# Patient Record
Sex: FEMALE | Race: BLACK OR AFRICAN AMERICAN | NOT HISPANIC OR LATINO | Employment: FULL TIME | URBAN - METROPOLITAN AREA
[De-identification: names, ages, dates, MRNs, and addresses within clinical notes are randomized per-mention and may not be internally consistent; named-entity substitution may affect disease eponyms.]

---

## 2017-10-02 ENCOUNTER — HOSPITAL ENCOUNTER (EMERGENCY)
Facility: HOSPITAL | Age: 27
Discharge: HOME | End: 2017-10-02
Attending: EMERGENCY MEDICINE
Payer: MEDICAID

## 2017-10-02 VITALS
BODY MASS INDEX: 19.7 KG/M2 | TEMPERATURE: 98 F | WEIGHT: 130 LBS | HEIGHT: 68 IN | DIASTOLIC BLOOD PRESSURE: 97 MMHG | OXYGEN SATURATION: 100 % | SYSTOLIC BLOOD PRESSURE: 135 MMHG | RESPIRATION RATE: 20 BRPM | HEART RATE: 75 BPM

## 2017-10-02 DIAGNOSIS — L03.213 PERIORBITAL CELLULITIS OF LEFT EYE: Primary | ICD-10-CM

## 2017-10-02 PROCEDURE — 99283 EMERGENCY DEPT VISIT LOW MDM: CPT

## 2017-10-02 RX ORDER — CLINDAMYCIN HYDROCHLORIDE 150 MG/1
450 CAPSULE ORAL EVERY 8 HOURS
Qty: 90 CAPSULE | Refills: 0 | Status: SHIPPED | OUTPATIENT
Start: 2017-10-02 | End: 2017-10-12

## 2017-10-02 RX ORDER — TRAMADOL HYDROCHLORIDE 50 MG/1
50 TABLET ORAL EVERY 6 HOURS PRN
Qty: 20 TABLET | Refills: 0 | Status: SHIPPED | OUTPATIENT
Start: 2017-10-02 | End: 2017-10-12

## 2017-10-02 NOTE — DISCHARGE INSTRUCTIONS
Call to schedule a follow up appointment for re-evaluation    Return to ED if symptoms worsen    Take anti-biotic as directed

## 2017-10-02 NOTE — ED TRIAGE NOTES
Patient complaints of left eye pain and swelling since Saturday.  Patient states it started looking like a stye and she put warm compresses to area, but now swelling and pain has increased.  She is unable to open her eye.  No shortness of breath, no other swelling or complaint.

## 2017-10-02 NOTE — ED PROVIDER NOTES
Encounter Date: 10/2/2017       History     Chief Complaint   Patient presents with    Facial Swelling     26 year old female presents to ed cc of left eye swelling that began on saturday. patient reports discharge from eye. patient reports pain when trying to open eye. patient reports using warm compressess with no relief.      27 y/o F presents with swelling and pain to left eyelid of acute onset 2 days ago. Pain is exacerbated by palpation, no relieving measures attempted.  Pt reports clear drainage from left eye.  No matting of lashes.  No change in vision.  No pain with EOM.  No fever/chills/swollen lymph nodes.  No eye trauma or recent infection.  Pt does not use contact lenses.  No meds pta for pain.           Review of patient's allergies indicates:  No Known Allergies  History reviewed. No pertinent past medical history.  Past Surgical History:   Procedure Laterality Date     SECTION, LOW TRANSVERSE      failed induction for oligo    EYE SURGERY      on right eye stye     History reviewed. No pertinent family history.  Social History   Substance Use Topics    Smoking status: Never Smoker    Smokeless tobacco: Never Used    Alcohol use No     Review of Systems   Constitutional: Negative for chills and fever.   HENT: Negative for congestion.    Eyes: Positive for pain and discharge. Negative for photophobia, redness and visual disturbance.        Left periorbital swelling   Gastrointestinal: Negative for nausea and vomiting.   Skin: Negative for pallor and rash.   Allergic/Immunologic: Negative for immunocompromised state.   Neurological: Negative for dizziness and headaches.   Hematological: Negative for adenopathy.   All other systems reviewed and are negative.      Physical Exam     Initial Vitals [10/02/17 1057]   BP Pulse Resp Temp SpO2   (!) 135/97 75 20 98.4 °F (36.9 °C) 100 %      MAP       109.67         Physical Exam    Nursing note and vitals reviewed.  Constitutional: She appears  well-developed and well-nourished. She is not diaphoretic. No distress.   HENT:   Head: Normocephalic and atraumatic.   Mouth/Throat: Oropharynx is clear and moist.   Eyes: Conjunctivae and EOM are normal. Pupils are equal, round, and reactive to light. Right eye exhibits no discharge. Left eye exhibits discharge. No scleral icterus.   Left upper eyelid swelling and TTP, no abscess, + cellulitis.      Pt has no pain with EOM   Neck: Normal range of motion. Neck supple.   Cardiovascular: Normal rate, regular rhythm and normal heart sounds. Exam reveals no gallop and no friction rub.    No murmur heard.  Pulmonary/Chest: Breath sounds normal. No respiratory distress.   Musculoskeletal: Normal range of motion.   Lymphadenopathy:     She has no cervical adenopathy.   Neurological: She is alert and oriented to person, place, and time.   Skin: Skin is warm and dry.   Psychiatric: She has a normal mood and affect. Her behavior is normal. Judgment and thought content normal.         ED Course   Procedures  Labs Reviewed   POCT URINE PREGNANCY             Medical Decision Making:   Initial Assessment:   27 y/o F presents with swelling and pain to left eyelid of acute onset 2 days ago. Pain is exacerbated by palpation, no relieving measures attempted.  Pt reports clear drainage from left eye.  No matting of lashes.  No change in vision.  No pain with EOM.  No fever/chills/swollen lymph nodes.  No eye trauma or recent infection.  Pt does not use contact lenses.  No meds pta for pain.           Differential Diagnosis:   DDX: conjunctivitis, periorbital cellulitis, orbital cellulitis  ED Management:   Pt exam is consistent with fern-orbital cellulitis.  She has no change in vision or pain with EOM.  She is non-toxic and well appearing.  I will tx with clindamycin and have her f/u with pcp and eye doctor.     Pt counseled on their diagnosis and the importance of following up with PCP.  Pt also cautioned on when to return to ED.   Pt verbalizes understanding of discharge plan and will return to ED immediately if symptoms worsen                     ED Course      Clinical Impression:   The encounter diagnosis was Periorbital cellulitis of left eye.    Disposition:   Disposition: Discharged  Condition: Stable                        Mariela Omer MD  10/02/17 1137

## 2017-10-05 ENCOUNTER — TELEPHONE (OUTPATIENT)
Dept: OPHTHALMOLOGY | Facility: CLINIC | Age: 27
End: 2017-10-05

## 2017-10-05 NOTE — TELEPHONE ENCOUNTER
Returned pt call regarding scheduling an ER f/u appointment for periorbital cellulitis.  Advised pt, per Terri, that we do not accept [facility] and pt could try following up with [facility].  Pt understood.

## 2017-11-12 PROBLEM — R19.7 VOMITING AND DIARRHEA: Status: ACTIVE | Noted: 2017-11-12

## 2017-11-12 PROBLEM — R11.10 VOMITING AND DIARRHEA: Status: ACTIVE | Noted: 2017-11-12

## 2019-08-27 ENCOUNTER — OFFICE VISIT (OUTPATIENT)
Dept: UROLOGY | Facility: CLINIC | Age: 29
End: 2019-08-27
Payer: MEDICAID

## 2019-08-27 VITALS
WEIGHT: 133 LBS | HEIGHT: 67 IN | RESPIRATION RATE: 19 BRPM | DIASTOLIC BLOOD PRESSURE: 64 MMHG | SYSTOLIC BLOOD PRESSURE: 111 MMHG | OXYGEN SATURATION: 99 % | HEART RATE: 72 BPM | BODY MASS INDEX: 20.88 KG/M2

## 2019-08-27 DIAGNOSIS — R35.0 URINARY FREQUENCY: ICD-10-CM

## 2019-08-27 DIAGNOSIS — R39.14 FEELING OF INCOMPLETE BLADDER EMPTYING: Primary | ICD-10-CM

## 2019-08-27 LAB
BILIRUB SERPL-MCNC: NORMAL MG/DL
BLOOD URINE, POC: NORMAL
COLOR, POC UA: YELLOW
GLUCOSE UR QL STRIP: NORMAL
KETONES UR QL STRIP: NORMAL
LEUKOCYTE ESTERASE URINE, POC: NORMAL
NITRITE, POC UA: NORMAL
PH, POC UA: 5
PROTEIN, POC: 30
SPECIFIC GRAVITY, POC UA: 1.03
UROBILINOGEN, POC UA: 0.2

## 2019-08-27 PROCEDURE — 99214 OFFICE O/P EST MOD 30 MIN: CPT | Mod: PBBFAC,PO | Performed by: NURSE PRACTITIONER

## 2019-08-27 PROCEDURE — 99203 OFFICE O/P NEW LOW 30 MIN: CPT | Mod: S$PBB,,, | Performed by: NURSE PRACTITIONER

## 2019-08-27 PROCEDURE — 81002 URINALYSIS NONAUTO W/O SCOPE: CPT | Mod: PBBFAC,PO | Performed by: NURSE PRACTITIONER

## 2019-08-27 PROCEDURE — 99999 PR PBB SHADOW E&M-EST. PATIENT-LVL IV: CPT | Mod: PBBFAC,,, | Performed by: NURSE PRACTITIONER

## 2019-08-27 PROCEDURE — 87086 URINE CULTURE/COLONY COUNT: CPT

## 2019-08-27 PROCEDURE — 99999 PR PBB SHADOW E&M-EST. PATIENT-LVL IV: ICD-10-PCS | Mod: PBBFAC,,, | Performed by: NURSE PRACTITIONER

## 2019-08-27 PROCEDURE — 99203 PR OFFICE/OUTPT VISIT, NEW, LEVL III, 30-44 MIN: ICD-10-PCS | Mod: S$PBB,,, | Performed by: NURSE PRACTITIONER

## 2019-08-27 RX ORDER — NITROFURANTOIN 25; 75 MG/1; MG/1
CAPSULE ORAL
COMMUNITY

## 2019-08-27 RX ORDER — METRONIDAZOLE 500 MG/1
TABLET ORAL
COMMUNITY

## 2019-08-27 RX ORDER — TAMSULOSIN HYDROCHLORIDE 0.4 MG/1
CAPSULE ORAL
COMMUNITY
Start: 2019-08-16 | End: 2019-08-27 | Stop reason: SDUPTHER

## 2019-08-27 RX ORDER — TAMSULOSIN HYDROCHLORIDE 0.4 MG/1
0.4 CAPSULE ORAL DAILY
Qty: 14 CAPSULE | Refills: 0 | Status: SHIPPED | OUTPATIENT
Start: 2019-08-27 | End: 2019-09-10

## 2019-08-27 RX ORDER — FLUCONAZOLE 150 MG/1
TABLET ORAL
COMMUNITY
End: 2021-05-20

## 2019-08-27 NOTE — PATIENT INSTRUCTIONS
1. Urine dipstick  2. Urine cx  3. Continue tamsulosin 0.4 mg daily for 2 weeks.  4. Follow-up in 3 weeks.

## 2019-08-27 NOTE — PROGRESS NOTES
Subjective:       Patient ID: Estelle Garcia is a 28 y.o. female.    Chief Complaint: Urinary Retention (health unit follow up for incomplete emptying)    Patient is new to me. She is a 27 yo AAF who is here today for evaluation of incomplete bladder emptying. Patient referred by PCP (Ana Luisa Morton). Patient reports she has not had any suprapubic pain since starting on tamsulosin 0.4 mg daily approximately 1 week ago by her PCP. She is currently being treated for BV and yeast infection.    Abdominal Pain   This is a new problem. Episode onset: 4 weeks ago. The onset quality is sudden. The problem occurs intermittently. The problem has been waxing and waning. The pain is located in the suprapubic region. The pain is at a severity of 0/10 (last experienced pain 1 week ago before starting on Flomax.). The patient is experiencing no pain. The quality of the pain is aching. The abdominal pain does not radiate. Associated symptoms include diarrhea (resolved) and frequency. Pertinent negatives include no anorexia, arthralgias, belching, constipation, dysuria, fever, flatus, headaches, hematochezia, hematuria, melena, myalgias, nausea, vomiting or weight loss. Nothing aggravates the pain. The pain is relieved by nothing. She has tried nothing for the symptoms. There is no history of abdominal surgery, colon cancer, Crohn's disease, gallstones, GERD, irritable bowel syndrome, pancreatitis, PUD or ulcerative colitis. Patient's medical history does not include kidney stones and UTI.     Review of Systems   Constitutional: Positive for fatigue. Negative for appetite change, chills, fever and weight loss.   Gastrointestinal: Positive for abdominal pain (suprapubic pain ) and diarrhea (resolved). Negative for anorexia, constipation, flatus, hematochezia, melena, nausea and vomiting.   Genitourinary: Positive for frequency. Negative for decreased urine volume, difficulty urinating, dysuria, flank pain, hematuria, pelvic pain,  urgency, vaginal bleeding, vaginal discharge and vaginal pain.        Feeling of incomplete bladder emptying.   Musculoskeletal: Positive for back pain (mid spine). Negative for arthralgias and myalgias.   Neurological: Negative for dizziness and headaches.       Objective:      Physical Exam   Constitutional: She is oriented to person, place, and time. She appears well-developed and well-nourished. No distress.   HENT:   Head: Normocephalic and atraumatic.   Eyes: Pupils are equal, round, and reactive to light. EOM are normal.   Neck: Normal range of motion.   Cardiovascular: Normal rate.   Pulmonary/Chest: Effort normal. No respiratory distress.   Abdominal: Soft. There is no tenderness.   Musculoskeletal: Normal range of motion. She exhibits no edema.   Neurological: She is alert and oriented to person, place, and time. Coordination normal.   Skin: Skin is warm and dry.   Psychiatric: She has a normal mood and affect. Her behavior is normal. Judgment and thought content normal.   Nursing note and vitals reviewed.      Assessment:       1. Feeling of incomplete bladder emptying    2. Urinary frequency        Plan:       Estelle was seen today for urinary retention.    Diagnoses and all orders for this visit:    Feeling of incomplete bladder emptying  -     tamsulosin (FLOMAX) 0.4 mg Cap; Take 1 capsule (0.4 mg total) by mouth once daily. for 14 days. REFILLED  -     POCT URINE DIPSTICK WITHOUT MICROSCOPE  -     Urine culture    Urinary frequency  -     POCT URINE DIPSTICK WITHOUT MICROSCOPE  -     Urine culture    Other order  1. Continue tamsulosin 0.4 mg daily for 2 weeks.    Follow-up in 3 weeks.     Jasmin Zaldivar NP

## 2019-08-27 NOTE — LETTER
August 27, 2019      Nohelia Morton NP  [address] [facility] [place] [address]           [place] [facility]  [address] [place] [address], [address]  [place] [address]  Phone: [phone]  Fax: [phone]          Patient: Estelle Garcia   MR Number: [id number]   YOB: 1990   Date of Visit: 8/27/2019       Dear Nohelia Morton:    Thank you for referring Estelle Garcia to me for evaluation. Attached you will find relevant portions of my assessment and plan of care.    If you have questions, please do not hesitate to call me. I look forward to following Estelle Garcia along with you.    Sincerely,    Jasmin Zaldivar NP    Enclosure  CC:  No Recipients    If you would like to receive this communication electronically, please contact [email or web address] or [phone] to request more information on [facility] Link access.    For providers and/or their staff who would like to refer a patient to [facility], please contact us through our one-stop-shop provider referral line, [facility], at [phone].    If you feel you have received this communication in error or would no longer like to receive these types of communications, please e-mail [email or web address]

## 2019-08-28 LAB
BACTERIA UR CULT: NORMAL
BACTERIA UR CULT: NORMAL

## 2019-08-29 ENCOUNTER — TELEPHONE (OUTPATIENT)
Dept: UROLOGY | Facility: CLINIC | Age: 29
End: 2019-08-29

## 2019-08-29 NOTE — TELEPHONE ENCOUNTER
----- Message from Jasmin Zaldivar NP sent at 8/29/2019  1:50 PM CDT -----  Please inform patient her urine cx showed some bacteria, but none in predominance. Keep F/U appt in 3 weeks.

## 2019-09-17 ENCOUNTER — OFFICE VISIT (OUTPATIENT)
Dept: UROLOGY | Facility: CLINIC | Age: 29
End: 2019-09-17
Payer: MEDICAID

## 2019-09-17 VITALS
HEIGHT: 67 IN | TEMPERATURE: 98 F | BODY MASS INDEX: 21.79 KG/M2 | DIASTOLIC BLOOD PRESSURE: 82 MMHG | OXYGEN SATURATION: 99 % | WEIGHT: 138.81 LBS | SYSTOLIC BLOOD PRESSURE: 110 MMHG | HEART RATE: 74 BPM

## 2019-09-17 DIAGNOSIS — Z09 FOLLOW-UP EXAMINATION: ICD-10-CM

## 2019-09-17 DIAGNOSIS — R39.14 FEELING OF INCOMPLETE BLADDER EMPTYING: Primary | ICD-10-CM

## 2019-09-17 LAB
BILIRUB SERPL-MCNC: NORMAL MG/DL
BLOOD URINE, POC: NORMAL
COLOR, POC UA: YELLOW
GLUCOSE UR QL STRIP: NORMAL
KETONES UR QL STRIP: NORMAL
LEUKOCYTE ESTERASE URINE, POC: NORMAL
NITRITE, POC UA: NORMAL
PH, POC UA: 5.5
POC RESIDUAL URINE VOLUME: 40 ML (ref 0–100)
PROTEIN, POC: 30
SPECIFIC GRAVITY, POC UA: 1.03
UROBILINOGEN, POC UA: 0.2

## 2019-09-17 PROCEDURE — 51798 US URINE CAPACITY MEASURE: CPT | Mod: PBBFAC,PO | Performed by: NURSE PRACTITIONER

## 2019-09-17 PROCEDURE — 99999 PR PBB SHADOW E&M-EST. PATIENT-LVL IV: CPT | Mod: PBBFAC,,, | Performed by: NURSE PRACTITIONER

## 2019-09-17 PROCEDURE — 99212 OFFICE O/P EST SF 10 MIN: CPT | Mod: S$PBB,,, | Performed by: NURSE PRACTITIONER

## 2019-09-17 PROCEDURE — 99999 PR PBB SHADOW E&M-EST. PATIENT-LVL IV: ICD-10-PCS | Mod: PBBFAC,,, | Performed by: NURSE PRACTITIONER

## 2019-09-17 PROCEDURE — 99212 PR OFFICE/OUTPT VISIT, EST, LEVL II, 10-19 MIN: ICD-10-PCS | Mod: S$PBB,,, | Performed by: NURSE PRACTITIONER

## 2019-09-17 PROCEDURE — 81002 URINALYSIS NONAUTO W/O SCOPE: CPT | Mod: PBBFAC,PO | Performed by: NURSE PRACTITIONER

## 2019-09-17 PROCEDURE — 99214 OFFICE O/P EST MOD 30 MIN: CPT | Mod: PBBFAC,PO,25 | Performed by: NURSE PRACTITIONER

## 2019-09-17 NOTE — PROGRESS NOTES
Subjective:       Patient ID: Estelle Garcia is a 28 y.o. female.    Chief Complaint: Urinary Retention (follow-up)    Patient is here today for a F/U for feeling of incomplete bladder emptying. She has been taking tamsulosin 0.4 mg daily for 4 weeks. Patient denies any suprapubic pain or difficulty voiding at this time. She has completed all her medications and reports she feels back to her normal self.     Other   Chronicity: incomplete bladder emptying. The current episode started more than 1 month ago. The problem occurs daily. The problem has been resolved. Pertinent negatives include no abdominal pain, anorexia, arthralgias, change in bowel habit, chills, diaphoresis, fatigue, fever, headaches, nausea, swollen glands, urinary symptoms, vomiting or weakness. Nothing aggravates the symptoms. Treatments tried: tamsulosin. The treatment provided significant relief.     Review of Systems   Constitutional: Negative for appetite change, chills, diaphoresis, fatigue and fever.   Gastrointestinal: Negative for abdominal pain, anorexia, change in bowel habit, constipation, diarrhea, nausea and vomiting.   Genitourinary: Negative for decreased urine volume, difficulty urinating, dysuria, flank pain, frequency, hematuria, urgency, vaginal bleeding, vaginal discharge and vaginal pain.   Musculoskeletal: Negative for arthralgias.   Neurological: Negative for dizziness, weakness and headaches.   Psychiatric/Behavioral: Negative.        Objective:      Physical Exam   Constitutional: She is oriented to person, place, and time. She appears well-developed and well-nourished. No distress.   HENT:   Head: Normocephalic and atraumatic.   Eyes: Pupils are equal, round, and reactive to light. EOM are normal.   Neck: Normal range of motion.   Cardiovascular: Normal rate.   Pulmonary/Chest: Effort normal. No respiratory distress.   Abdominal: Soft. There is no tenderness.   Genitourinary:   Genitourinary Comments: PVR = 40 mL    Musculoskeletal: Normal range of motion. She exhibits no edema.   Neurological: She is alert and oriented to person, place, and time. Coordination normal.   Skin: Skin is warm and dry.   Psychiatric: She has a normal mood and affect. Her behavior is normal. Judgment and thought content normal.   Nursing note and vitals reviewed.      Assessment:       1. Feeling of incomplete bladder emptying    2. Follow-up examination        Plan:       Estelle was seen today for urinary retention.    Diagnoses and all orders for this visit:    Feeling of incomplete bladder emptying  -     POCT Bladder Scan  -     POCT URINE DIPSTICK WITHOUT MICROSCOPE    Follow-up examination  -     POCT Bladder Scan  -     POCT URINE DIPSTICK WITHOUT MICROSCOPE    Follow-up as needed.    Jasmin Zaldivar NP

## 2019-12-29 ENCOUNTER — HOSPITAL ENCOUNTER (EMERGENCY)
Facility: HOSPITAL | Age: 29
Discharge: HOME | End: 2019-12-29
Attending: EMERGENCY MEDICINE
Payer: MEDICAID

## 2019-12-29 VITALS
SYSTOLIC BLOOD PRESSURE: 115 MMHG | BODY MASS INDEX: 20.4 KG/M2 | RESPIRATION RATE: 16 BRPM | DIASTOLIC BLOOD PRESSURE: 77 MMHG | HEART RATE: 75 BPM | WEIGHT: 130 LBS | TEMPERATURE: 98 F | HEIGHT: 67 IN | OXYGEN SATURATION: 98 %

## 2019-12-29 DIAGNOSIS — B34.9 VIRAL ILLNESS: Primary | ICD-10-CM

## 2019-12-29 LAB
ALBUMIN SERPL BCP-MCNC: 3.8 G/DL (ref 3.5–5.2)
ALP SERPL-CCNC: 53 U/L (ref 55–135)
ALT SERPL W/O P-5'-P-CCNC: 14 U/L (ref 10–44)
ANION GAP SERPL CALC-SCNC: 11 MMOL/L (ref 8–16)
ANISOCYTOSIS BLD QL SMEAR: SLIGHT
AST SERPL-CCNC: 18 U/L (ref 10–40)
B-HCG UR QL: NEGATIVE
BACTERIA #/AREA URNS HPF: ABNORMAL /HPF
BASOPHILS # BLD AUTO: 0.02 K/UL (ref 0–0.2)
BASOPHILS NFR BLD: 0.3 % (ref 0–1.9)
BILIRUB SERPL-MCNC: 0.7 MG/DL (ref 0.1–1)
BILIRUB UR QL STRIP: NEGATIVE
BUN SERPL-MCNC: 11 MG/DL (ref 6–20)
CALCIUM SERPL-MCNC: 9.4 MG/DL (ref 8.7–10.5)
CHLORIDE SERPL-SCNC: 106 MMOL/L (ref 95–110)
CLARITY UR: ABNORMAL
CO2 SERPL-SCNC: 22 MMOL/L (ref 23–29)
COLOR UR: ABNORMAL
CREAT SERPL-MCNC: 0.7 MG/DL (ref 0.5–1.4)
CTP QC/QA: YES
DIFFERENTIAL METHOD: ABNORMAL
EOSINOPHIL # BLD AUTO: 0.2 K/UL (ref 0–0.5)
EOSINOPHIL NFR BLD: 2.9 % (ref 0–8)
ERYTHROCYTE [DISTWIDTH] IN BLOOD BY AUTOMATED COUNT: 15.4 % (ref 11.5–14.5)
EST. GFR  (AFRICAN AMERICAN): >60 ML/MIN/1.73 M^2
EST. GFR  (NON AFRICAN AMERICAN): >60 ML/MIN/1.73 M^2
GLUCOSE SERPL-MCNC: 87 MG/DL (ref 70–110)
GLUCOSE UR QL STRIP: NEGATIVE
HCT VFR BLD AUTO: 32.5 % (ref 37–48.5)
HGB BLD-MCNC: 9.6 G/DL (ref 12–16)
HGB UR QL STRIP: ABNORMAL
HYALINE CASTS #/AREA URNS LPF: 0 /LPF
HYPOCHROMIA BLD QL SMEAR: ABNORMAL
INFLUENZA A, MOLECULAR: NEGATIVE
INFLUENZA B, MOLECULAR: NEGATIVE
KETONES UR QL STRIP: NEGATIVE
LEUKOCYTE ESTERASE UR QL STRIP: NEGATIVE
LIPASE SERPL-CCNC: 35 U/L (ref 4–60)
LYMPHOCYTES # BLD AUTO: 3.6 K/UL (ref 1–4.8)
LYMPHOCYTES NFR BLD: 47.6 % (ref 18–48)
MCH RBC QN AUTO: 22.1 PG (ref 27–31)
MCHC RBC AUTO-ENTMCNC: 29.5 G/DL (ref 32–36)
MCV RBC AUTO: 75 FL (ref 82–98)
MICROSCOPIC COMMENT: ABNORMAL
MONOCYTES # BLD AUTO: 0.6 K/UL (ref 0.3–1)
MONOCYTES NFR BLD: 8 % (ref 4–15)
NEUTROPHILS # BLD AUTO: 3.1 K/UL (ref 1.8–7.7)
NEUTROPHILS NFR BLD: 41.2 % (ref 38–73)
NITRITE UR QL STRIP: NEGATIVE
OVALOCYTES BLD QL SMEAR: ABNORMAL
PH UR STRIP: 7 [PH] (ref 5–8)
PLATELET # BLD AUTO: 320 K/UL (ref 150–350)
PLATELET BLD QL SMEAR: ABNORMAL
PMV BLD AUTO: 10.1 FL (ref 9.2–12.9)
POIKILOCYTOSIS BLD QL SMEAR: SLIGHT
POTASSIUM SERPL-SCNC: 3.8 MMOL/L (ref 3.5–5.1)
PROT SERPL-MCNC: 6.9 G/DL (ref 6–8.4)
PROT UR QL STRIP: ABNORMAL
RBC # BLD AUTO: 4.35 M/UL (ref 4–5.4)
RBC #/AREA URNS HPF: >100 /HPF (ref 0–4)
SODIUM SERPL-SCNC: 139 MMOL/L (ref 136–145)
SP GR UR STRIP: 1.02 (ref 1–1.03)
SPECIMEN SOURCE: NORMAL
URN SPEC COLLECT METH UR: ABNORMAL
UROBILINOGEN UR STRIP-ACNC: 1 EU/DL
WBC # BLD AUTO: 7.6 K/UL (ref 3.9–12.7)
WBC #/AREA URNS HPF: 0 /HPF (ref 0–5)

## 2019-12-29 PROCEDURE — 81000 URINALYSIS NONAUTO W/SCOPE: CPT

## 2019-12-29 PROCEDURE — 83690 ASSAY OF LIPASE: CPT

## 2019-12-29 PROCEDURE — 85025 COMPLETE CBC W/AUTO DIFF WBC: CPT

## 2019-12-29 PROCEDURE — 63600175 PHARM REV CODE 636 W HCPCS: Performed by: EMERGENCY MEDICINE

## 2019-12-29 PROCEDURE — 81025 URINE PREGNANCY TEST: CPT | Performed by: EMERGENCY MEDICINE

## 2019-12-29 PROCEDURE — 87502 INFLUENZA DNA AMP PROBE: CPT

## 2019-12-29 PROCEDURE — 80053 COMPREHEN METABOLIC PANEL: CPT

## 2019-12-29 PROCEDURE — 96361 HYDRATE IV INFUSION ADD-ON: CPT

## 2019-12-29 PROCEDURE — 25000003 PHARM REV CODE 250: Performed by: EMERGENCY MEDICINE

## 2019-12-29 PROCEDURE — 99284 EMERGENCY DEPT VISIT MOD MDM: CPT | Mod: 25

## 2019-12-29 PROCEDURE — 96374 THER/PROPH/DIAG INJ IV PUSH: CPT

## 2019-12-29 RX ORDER — ACETAMINOPHEN 325 MG/1
650 TABLET ORAL
Status: COMPLETED | OUTPATIENT
Start: 2019-12-29 | End: 2019-12-29

## 2019-12-29 RX ORDER — KETOROLAC TROMETHAMINE 30 MG/ML
15 INJECTION, SOLUTION INTRAMUSCULAR; INTRAVENOUS
Status: COMPLETED | OUTPATIENT
Start: 2019-12-29 | End: 2019-12-29

## 2019-12-29 RX ORDER — IBUPROFEN 400 MG/1
400 TABLET ORAL EVERY 6 HOURS PRN
Qty: 20 TABLET | Refills: 0 | Status: SHIPPED | OUTPATIENT
Start: 2019-12-29 | End: 2021-05-20

## 2019-12-29 RX ORDER — DICYCLOMINE HYDROCHLORIDE 20 MG/1
20 TABLET ORAL 2 TIMES DAILY PRN
Qty: 20 TABLET | Refills: 0 | Status: SHIPPED | OUTPATIENT
Start: 2019-12-29 | End: 2020-01-28

## 2019-12-29 RX ADMIN — KETOROLAC TROMETHAMINE 15 MG: 30 INJECTION, SOLUTION INTRAMUSCULAR at 09:12

## 2019-12-29 RX ADMIN — LIDOCAINE HYDROCHLORIDE: 20 SOLUTION ORAL; TOPICAL at 10:12

## 2019-12-29 RX ADMIN — SODIUM CHLORIDE 1000 ML: 0.9 INJECTION, SOLUTION INTRAVENOUS at 09:12

## 2019-12-29 RX ADMIN — ACETAMINOPHEN 650 MG: 325 TABLET ORAL at 11:12

## 2019-12-30 NOTE — DISCHARGE INSTRUCTIONS
You may take Tylenol or Motrin as needed for fever and body aches.  Please drink plenty of fluids.  Return to the emergency department if you worsen in any way.

## 2019-12-30 NOTE — ED PROVIDER NOTES
"Encounter Date: 2019    SCRIBE #1 NOTE: I, Katey Paige, am scribing for, and in the presence of,  Gail Haynes MD. I have scribed the entire note.       History     Chief Complaint   Patient presents with    Abdominal Pain     29y F ambulatory to ED with c/o flu-like symptoms x1 week, epigastric pain, subjective fever, and "feeling weak." pt child had flu recently, pt was tested and negative for flu on 19.     Time seen by provider: 9:44 PM    This is a 28 y/o female with no significant PMHx who presents with complaint of epigastric abdominal pain and flu-like sx for 1 week. The abdominal pain does not have any eliciting, aggravating, or alleviating factors, and she reports that she has never had this kind of abdominal pain in the past. Her associated symptoms include lightheadedness, subjective fever, chills, nausea, generalized body aches, increased urinary urgency, decreased urinary output, and some intermittent SOB. The patient does acknowledge eating a little less since the onset of symptoms. The patient's son had the flu 2x. On  the patient presented to [facility] ED with flu like symptoms and tested negative for flu. She denies nasal congestion, vomiting, diarrhea, or any other concerning symptoms. Home therapy attempts include Nyquil and new Advil for Cold and Flu without relief.  She is currently menstruating, and her past abdominal surgical history includes 2 c-sections.    The history is provided by the patient.     Review of patient's allergies indicates:  No Known Allergies  History reviewed. No pertinent past medical history.  Past Surgical History:   Procedure Laterality Date     SECTION, LOW TRANSVERSE      failed induction for oligo    EYE SURGERY      on right eye stye     History reviewed. No pertinent family history.  Social History     Tobacco Use    Smoking status: Never Smoker    Smokeless tobacco: Never Used   Substance Use Topics    Alcohol use: " No    Drug use: No     Review of Systems   Constitutional: Positive for chills and fever (subjective).   HENT: Negative for sore throat.    Eyes: Negative for visual disturbance.   Respiratory: Positive for shortness of breath (intermittent, mild).    Cardiovascular: Negative for chest pain.   Gastrointestinal: Positive for abdominal pain (epigastric) and nausea. Negative for vomiting.   Genitourinary: Positive for decreased urine volume (decreased output) and urgency (increased). Negative for dysuria and hematuria.   Musculoskeletal: Positive for myalgias (generalized body aches). Negative for back pain, neck pain and neck stiffness.   Skin: Negative for rash and wound.   Neurological: Positive for light-headedness. Negative for syncope, weakness and headaches.   Psychiatric/Behavioral: Negative for agitation, behavioral problems and confusion.       Physical Exam     Initial Vitals [12/29/19 2120]   BP Pulse Resp Temp SpO2   113/76 73 16 98.3 °F (36.8 °C) 100 %      MAP       --         Physical Exam    Nursing note and vitals reviewed.  Constitutional: She appears well-developed and well-nourished. She is not diaphoretic. No distress.   HENT:   Head: Normocephalic and atraumatic.   Right Ear: Tympanic membrane and external ear normal.   Left Ear: Tympanic membrane and external ear normal.   Nose: Nose normal.   Eyes: Conjunctivae and EOM are normal. Pupils are equal, round, and reactive to light.   Neck: Normal range of motion. Neck supple.   No meningismus   Cardiovascular: Normal rate, regular rhythm and normal heart sounds. Exam reveals no gallop and no friction rub.    No murmur heard.  Pulmonary/Chest: Breath sounds normal. She has no wheezes. She has no rhonchi. She has no rales.   Abdominal: Soft. Bowel sounds are normal. She exhibits no distension. There is no tenderness. There is no rebound and no guarding.   Musculoskeletal: Normal range of motion. She exhibits no edema or tenderness.   Neurological:  She is alert and oriented to person, place, and time. No cranial nerve deficit. GCS score is 15. GCS eye subscore is 4. GCS verbal subscore is 5. GCS motor subscore is 6.   Skin: Skin is warm and dry. Capillary refill takes less than 2 seconds. No rash noted.         ED Course   Procedures  Labs Reviewed   CBC W/ AUTO DIFFERENTIAL - Abnormal; Notable for the following components:       Result Value    Hemoglobin 9.6 (*)     Hematocrit 32.5 (*)     Mean Corpuscular Volume 75 (*)     Mean Corpuscular Hemoglobin 22.1 (*)     Mean Corpuscular Hemoglobin Conc 29.5 (*)     RDW 15.4 (*)     All other components within normal limits   COMPREHENSIVE METABOLIC PANEL - Abnormal; Notable for the following components:    CO2 22 (*)     Alkaline Phosphatase 53 (*)     All other components within normal limits   URINALYSIS, REFLEX TO URINE CULTURE - Abnormal; Notable for the following components:    Color, UA Red (*)     Appearance, UA Hazy (*)     Protein, UA 2+ (*)     Occult Blood UA 3+ (*)     All other components within normal limits    Narrative:     Preferred Collection Type->Urine, Clean Catch   URINALYSIS MICROSCOPIC - Abnormal; Notable for the following components:    RBC, UA >100 (*)     All other components within normal limits    Narrative:     Preferred Collection Type->Urine, Clean Catch   INFLUENZA A & B BY MOLECULAR   LIPASE   POCT URINE PREGNANCY               Medical Decision Making:   History:   Old Medical Records: I decided to obtain old medical records.  Initial Assessment:   This is a 28 y/o female with no significant PMHx who presents with complaint of epigastric abdominal pain for 1 week.  Differential Diagnosis:   Gastroenteritis, gastritis, ulcer, cholecystitis, gallstones, pancreatitis, ileus, small bowel obstruction, appendicitis, constipation.     Clinical Tests:   Lab Tests: Reviewed and Ordered  ED Management:    On re-evaluation, the patient's status has improved.  Tolerating PO well  After  complete ED evaluation, clinical impression is most consistent with viral illness.   - influenza negative  - no leukocytosis, H&H stable  - lipase wnl  - UA not consistent with infection, >100 RBCs, patient is on menstrual cycle.  - Cr, liver enzymes wnl  - UPT negative    PCP follow-up within 2-3 days was recommended.    After taking into careful account the patient's history, physical exam findings, as well as empirical and objective data obtained throughout ED workup, I feel no emergent medical condition has been identified. No further evaluation or admission was felt to be required, and the patient is stable for discharge from the ED. The patient and any additional family present were updated with test results, overall clinical impression, and recommended further plan of care, including discharge instructions as provided and outpatient follow-up for continued evaluation and management as needed. All questions were answered. The patient expressed understanding and agreed with current plan for discharge and follow-up plan of care. Strict ED return precautions were provided, including return/worsening of current symptoms, new symptoms, or any other concerns.                     ED Course as of Dec 30 0000   Sun Dec 29, 2019   2132 BP: 113/76 [LD]   2132 Temp: 98.3 °F (36.8 °C) [LD]   2132 Pulse: 73 [LD]   2132 Resp: 16 [LD]   2132 SpO2: 100 % [LD]   2232 Patient on menstrual cycle   RBC, UA(!): >100 [LD]   2232 Lipase: 35 [LD]   2248 Preg Test, Ur: Negative [LD]   2248 Influenza A, Molecular: Negative [LD]   2248 Influenza B, Molecular: Negative [LD]   2303 Patient tolerating PO well.    [LD]      ED Course User Index  [LD] Gail Haynes MD                Clinical Impression:       ICD-10-CM ICD-9-CM   1. Viral illness B34.9 079.99         Disposition:   Disposition: Discharged  Condition: Stable      I, Gail Haynes,  personally performed the services described in this documentation. All medical record  entries made by the scribe were at my direction and in my presence.  I have reviewed the chart and agree that the record reflects my personal performance and is accurate and complete. Gail Haynes M.D. 12:00 AM12/30/2019             Gail Haynes MD  12/30/19 0000

## 2020-02-16 ENCOUNTER — HOSPITAL ENCOUNTER (EMERGENCY)
Facility: HOSPITAL | Age: 30
Discharge: HOME | End: 2020-02-16
Attending: EMERGENCY MEDICINE
Payer: MEDICAID

## 2020-02-16 VITALS
BODY MASS INDEX: 20.4 KG/M2 | HEART RATE: 91 BPM | DIASTOLIC BLOOD PRESSURE: 67 MMHG | HEIGHT: 67 IN | OXYGEN SATURATION: 100 % | TEMPERATURE: 98 F | SYSTOLIC BLOOD PRESSURE: 110 MMHG | RESPIRATION RATE: 18 BRPM | WEIGHT: 130 LBS

## 2020-02-16 DIAGNOSIS — K52.9 GASTROENTERITIS: Primary | ICD-10-CM

## 2020-02-16 LAB
B-HCG UR QL: NEGATIVE
CTP QC/QA: YES

## 2020-02-16 PROCEDURE — 81025 URINE PREGNANCY TEST: CPT | Performed by: NURSE PRACTITIONER

## 2020-02-16 PROCEDURE — 99284 EMERGENCY DEPT VISIT MOD MDM: CPT

## 2020-02-16 PROCEDURE — 25000003 PHARM REV CODE 250: Performed by: NURSE PRACTITIONER

## 2020-02-16 RX ORDER — DICYCLOMINE HYDROCHLORIDE 20 MG/1
20 TABLET ORAL 2 TIMES DAILY
Qty: 10 TABLET | Refills: 0 | Status: SHIPPED | OUTPATIENT
Start: 2020-02-16 | End: 2020-03-17

## 2020-02-16 RX ORDER — ONDANSETRON 4 MG/1
4 TABLET, ORALLY DISINTEGRATING ORAL EVERY 8 HOURS PRN
Qty: 10 TABLET | Refills: 0 | Status: SHIPPED | OUTPATIENT
Start: 2020-02-16 | End: 2021-05-20

## 2020-02-16 RX ORDER — DICYCLOMINE HYDROCHLORIDE 10 MG/1
20 CAPSULE ORAL
Status: COMPLETED | OUTPATIENT
Start: 2020-02-16 | End: 2020-02-16

## 2020-02-16 RX ORDER — ONDANSETRON 4 MG/1
4 TABLET, ORALLY DISINTEGRATING ORAL
Status: COMPLETED | OUTPATIENT
Start: 2020-02-16 | End: 2020-02-16

## 2020-02-16 RX ADMIN — ONDANSETRON 4 MG: 4 TABLET, ORALLY DISINTEGRATING ORAL at 08:02

## 2020-02-16 RX ADMIN — DICYCLOMINE HYDROCHLORIDE 20 MG: 10 CAPSULE ORAL at 09:02

## 2020-02-16 NOTE — DISCHARGE INSTRUCTIONS
Increase your fluid intake.  Return to the ED if your condition changes, progresses, or if you have any concerns.

## 2020-02-16 NOTE — ED PROVIDER NOTES
Encounter Date: 2020       History     Chief Complaint   Patient presents with    Nausea     pt presents to ED today c/o N/V/D and body chills onset 0000, unrelieved by pepto bismol     Vomiting    Diarrhea    Chills     29-year-old female presents the ED with her daughter for nausea, vomiting, and diarrhea which started around midnight last night.  She denies hematemesis and rectal bleeding.  She has had several episodes of each vomiting and diarrhea since midnight.  She has tried Pepto-Bismol without relief.  No fever, recent antibiotics, or recent travel.  She did eat at a Chinese buffet yesterday and is unsure if there was contaminated food.  No other complaints at this time.    The history is provided by the patient.     Review of patient's allergies indicates:  No Known Allergies  No past medical history on file.  Past Surgical History:   Procedure Laterality Date     SECTION, LOW TRANSVERSE      failed induction for oligo    EYE SURGERY      on right eye stye     No family history on file.  Social History     Tobacco Use    Smoking status: Never Smoker    Smokeless tobacco: Never Used   Substance Use Topics    Alcohol use: No    Drug use: No     Review of Systems   Constitutional: Positive for activity change, appetite change and chills. Negative for fever.   HENT: Negative for congestion.    Respiratory: Negative for cough.    Cardiovascular: Negative for chest pain.   Gastrointestinal: Positive for diarrhea, nausea and vomiting. Negative for abdominal pain and blood in stool.   Genitourinary: Negative for difficulty urinating and dysuria.   Musculoskeletal: Negative for back pain.   Skin: Negative for rash.   Neurological: Negative for headaches.   Psychiatric/Behavioral: Negative for confusion.       Physical Exam     Initial Vitals [20 0758]   BP Pulse Resp Temp SpO2   109/64 106 20 98.5 °F (36.9 °C) 100 %      MAP       --         Physical Exam    Nursing note and vitals  reviewed.  Constitutional: Vital signs are normal. She appears well-developed and well-nourished. She is active and cooperative. She is easily aroused.  Non-toxic appearance. She does not have a sickly appearance. She does not appear ill. No distress.   HENT:   Head: Normocephalic and atraumatic.   Eyes: Conjunctivae and EOM are normal.   Neck: Normal range of motion. Neck supple.   Cardiovascular: Normal rate, regular rhythm and normal heart sounds.   Pulmonary/Chest: Effort normal and breath sounds normal.   Abdominal: Soft. Normal appearance and bowel sounds are normal. She exhibits no distension. There is no tenderness.   Neurological: She is alert, oriented to person, place, and time and easily aroused. She has normal strength. GCS eye subscore is 4. GCS verbal subscore is 5. GCS motor subscore is 6.   Skin: Skin is warm, dry and intact. No rash noted. No pallor.   Psychiatric: She has a normal mood and affect. Her speech is normal and behavior is normal. Judgment and thought content normal. Cognition and memory are normal.         ED Course   Procedures  Labs Reviewed   POCT URINE PREGNANCY          Imaging Results    None          Medical Decision Making:   Initial Assessment:   29-year-old female here for nausea, vomiting, and diarrhea that started around midnight last night.  No hematemesis or rectal bleeding.  The patient appears well, nontoxic.  Her abdomen is soft and nontender.  Differential Diagnosis:   Gastroenteritis, dehydration  Clinical Tests:   Lab Tests: Ordered and Reviewed  The following lab test(s) were unremarkable: UPT  ED Management:  Zofran, bentyl  The patient is tolerating oral fluids and food after Zofran.  I do not suspect acute bacterial gastroenteritis.  Likely viral etiology of symptoms.  Encouraged symptomatic care.  Pt to follow up with PCP within 2 days.  I reviewed strict return precautions. In addition, pt is to return to the ED if condition changes, progresses, or if there  are any concerns.  Pt verbalized understanding, compliance, and agreement with the treatment plan.                                     Clinical Impression:       ICD-10-CM ICD-9-CM   1. Gastroenteritis K52.9 558.9                             Candie Marcelino, AYAAN  02/16/20 0921

## 2021-05-04 ENCOUNTER — PATIENT MESSAGE (OUTPATIENT)
Dept: RESEARCH | Facility: HOSPITAL | Age: 31
End: 2021-05-04

## 2021-05-10 ENCOUNTER — PATIENT MESSAGE (OUTPATIENT)
Dept: RESEARCH | Facility: HOSPITAL | Age: 31
End: 2021-05-10

## 2021-06-02 ENCOUNTER — HOSPITAL ENCOUNTER (EMERGENCY)
Facility: HOSPITAL | Age: 31
Discharge: HOME | End: 2021-06-02
Attending: EMERGENCY MEDICINE
Payer: MEDICAID

## 2021-06-02 VITALS
OXYGEN SATURATION: 100 % | TEMPERATURE: 98 F | BODY MASS INDEX: 20.4 KG/M2 | DIASTOLIC BLOOD PRESSURE: 80 MMHG | RESPIRATION RATE: 16 BRPM | HEART RATE: 70 BPM | HEIGHT: 67 IN | WEIGHT: 130 LBS | SYSTOLIC BLOOD PRESSURE: 130 MMHG

## 2021-06-02 DIAGNOSIS — K62.5 RECTAL BLEEDING: Primary | ICD-10-CM

## 2021-06-02 LAB
ALBUMIN SERPL BCP-MCNC: 4.3 G/DL (ref 3.5–5.2)
ALP SERPL-CCNC: 63 U/L (ref 55–135)
ALT SERPL W/O P-5'-P-CCNC: 10 U/L (ref 10–44)
ANION GAP SERPL CALC-SCNC: 12 MMOL/L (ref 8–16)
AST SERPL-CCNC: 19 U/L (ref 10–40)
B-HCG UR QL: NEGATIVE
BASOPHILS # BLD AUTO: 0.04 K/UL (ref 0–0.2)
BASOPHILS NFR BLD: 0.6 % (ref 0–1.9)
BILIRUB SERPL-MCNC: 0.4 MG/DL (ref 0.1–1)
BILIRUB UR QL STRIP: NEGATIVE
BUN SERPL-MCNC: 11 MG/DL (ref 6–20)
CALCIUM SERPL-MCNC: 9.6 MG/DL (ref 8.7–10.5)
CHLORIDE SERPL-SCNC: 106 MMOL/L (ref 95–110)
CLARITY UR: CLEAR
CO2 SERPL-SCNC: 21 MMOL/L (ref 23–29)
COLOR UR: YELLOW
CREAT SERPL-MCNC: 0.8 MG/DL (ref 0.5–1.4)
CTP QC/QA: YES
DIFFERENTIAL METHOD: ABNORMAL
EOSINOPHIL # BLD AUTO: 0.2 K/UL (ref 0–0.5)
EOSINOPHIL NFR BLD: 2.9 % (ref 0–8)
ERYTHROCYTE [DISTWIDTH] IN BLOOD BY AUTOMATED COUNT: 16.7 % (ref 11.5–14.5)
EST. GFR  (AFRICAN AMERICAN): >60 ML/MIN/1.73 M^2
EST. GFR  (NON AFRICAN AMERICAN): >60 ML/MIN/1.73 M^2
GLUCOSE SERPL-MCNC: 85 MG/DL (ref 70–110)
GLUCOSE UR QL STRIP: NEGATIVE
HCT VFR BLD AUTO: 33.8 % (ref 37–48.5)
HGB BLD-MCNC: 9.6 G/DL (ref 12–16)
HGB UR QL STRIP: NEGATIVE
IMM GRANULOCYTES # BLD AUTO: 0.02 K/UL (ref 0–0.04)
IMM GRANULOCYTES NFR BLD AUTO: 0.3 % (ref 0–0.5)
KETONES UR QL STRIP: NEGATIVE
LEUKOCYTE ESTERASE UR QL STRIP: NEGATIVE
LYMPHOCYTES # BLD AUTO: 3.3 K/UL (ref 1–4.8)
LYMPHOCYTES NFR BLD: 50.4 % (ref 18–48)
MCH RBC QN AUTO: 21.1 PG (ref 27–31)
MCHC RBC AUTO-ENTMCNC: 28.4 G/DL (ref 32–36)
MCV RBC AUTO: 74 FL (ref 82–98)
MONOCYTES # BLD AUTO: 0.5 K/UL (ref 0.3–1)
MONOCYTES NFR BLD: 7.3 % (ref 4–15)
NEUTROPHILS # BLD AUTO: 2.5 K/UL (ref 1.8–7.7)
NEUTROPHILS NFR BLD: 38.5 % (ref 38–73)
NITRITE UR QL STRIP: NEGATIVE
NRBC BLD-RTO: 0 /100 WBC
PH UR STRIP: 6 [PH] (ref 5–8)
PLATELET # BLD AUTO: 385 K/UL (ref 150–450)
PMV BLD AUTO: 9.8 FL (ref 9.2–12.9)
POTASSIUM SERPL-SCNC: 4.2 MMOL/L (ref 3.5–5.1)
PROT SERPL-MCNC: 7.6 G/DL (ref 6–8.4)
PROT UR QL STRIP: ABNORMAL
RBC # BLD AUTO: 4.55 M/UL (ref 4–5.4)
SODIUM SERPL-SCNC: 139 MMOL/L (ref 136–145)
SP GR UR STRIP: >1.03 (ref 1–1.03)
URN SPEC COLLECT METH UR: ABNORMAL
UROBILINOGEN UR STRIP-ACNC: NEGATIVE EU/DL
WBC # BLD AUTO: 6.59 K/UL (ref 3.9–12.7)

## 2021-06-02 PROCEDURE — 81003 URINALYSIS AUTO W/O SCOPE: CPT | Performed by: PHYSICIAN ASSISTANT

## 2021-06-02 PROCEDURE — 80053 COMPREHEN METABOLIC PANEL: CPT | Performed by: PHYSICIAN ASSISTANT

## 2021-06-02 PROCEDURE — 85025 COMPLETE CBC W/AUTO DIFF WBC: CPT | Performed by: PHYSICIAN ASSISTANT

## 2021-06-02 PROCEDURE — 99283 EMERGENCY DEPT VISIT LOW MDM: CPT

## 2021-06-02 PROCEDURE — 81025 URINE PREGNANCY TEST: CPT | Performed by: PHYSICIAN ASSISTANT

## 2021-08-16 ENCOUNTER — LAB VISIT (OUTPATIENT)
Dept: PRIMARY CARE CLINIC | Facility: OTHER | Age: 31
End: 2021-08-16
Payer: MEDICAID

## 2021-08-16 DIAGNOSIS — R05.9 COUGH: ICD-10-CM

## 2021-08-16 PROCEDURE — U0003 INFECTIOUS AGENT DETECTION BY NUCLEIC ACID (DNA OR RNA); SEVERE ACUTE RESPIRATORY SYNDROME CORONAVIRUS 2 (SARS-COV-2) (CORONAVIRUS DISEASE [COVID-19]), AMPLIFIED PROBE TECHNIQUE, MAKING USE OF HIGH THROUGHPUT TECHNOLOGIES AS DESCRIBED BY CMS-2020-01-R: HCPCS | Performed by: INTERNAL MEDICINE

## 2021-08-17 ENCOUNTER — HOSPITAL ENCOUNTER (EMERGENCY)
Facility: HOSPITAL | Age: 31
Discharge: HOME | End: 2021-08-17
Attending: EMERGENCY MEDICINE
Payer: MEDICAID

## 2021-08-17 VITALS
TEMPERATURE: 98 F | BODY MASS INDEX: 22.55 KG/M2 | RESPIRATION RATE: 16 BRPM | SYSTOLIC BLOOD PRESSURE: 116 MMHG | DIASTOLIC BLOOD PRESSURE: 72 MMHG | WEIGHT: 144 LBS | HEART RATE: 74 BPM | OXYGEN SATURATION: 100 %

## 2021-08-17 DIAGNOSIS — R10.13 EPIGASTRIC PAIN: Primary | ICD-10-CM

## 2021-08-17 DIAGNOSIS — Z20.822 COVID-19 VIRUS NOT DETECTED: ICD-10-CM

## 2021-08-17 LAB
ALBUMIN SERPL BCP-MCNC: 4.2 G/DL (ref 3.5–5.2)
ALP SERPL-CCNC: 56 U/L (ref 55–135)
ALT SERPL W/O P-5'-P-CCNC: 17 U/L (ref 10–44)
ANION GAP SERPL CALC-SCNC: 11 MMOL/L (ref 8–16)
AST SERPL-CCNC: 22 U/L (ref 10–40)
B-HCG UR QL: NEGATIVE
BACTERIA #/AREA URNS HPF: ABNORMAL /HPF
BASOPHILS # BLD AUTO: 0.04 K/UL (ref 0–0.2)
BASOPHILS NFR BLD: 0.7 % (ref 0–1.9)
BILIRUB SERPL-MCNC: 1.1 MG/DL (ref 0.1–1)
BILIRUB UR QL STRIP: NEGATIVE
BUN SERPL-MCNC: 7 MG/DL (ref 6–20)
CALCIUM SERPL-MCNC: 9.7 MG/DL (ref 8.7–10.5)
CHLORIDE SERPL-SCNC: 106 MMOL/L (ref 95–110)
CLARITY UR: CLEAR
CO2 SERPL-SCNC: 20 MMOL/L (ref 23–29)
COLOR UR: YELLOW
CREAT SERPL-MCNC: 0.8 MG/DL (ref 0.5–1.4)
CTP QC/QA: YES
CTP QC/QA: YES
DIFFERENTIAL METHOD: ABNORMAL
EOSINOPHIL # BLD AUTO: 0.2 K/UL (ref 0–0.5)
EOSINOPHIL NFR BLD: 3.3 % (ref 0–8)
ERYTHROCYTE [DISTWIDTH] IN BLOOD BY AUTOMATED COUNT: 16.8 % (ref 11.5–14.5)
EST. GFR  (AFRICAN AMERICAN): >60 ML/MIN/1.73 M^2
EST. GFR  (NON AFRICAN AMERICAN): >60 ML/MIN/1.73 M^2
GLUCOSE SERPL-MCNC: 81 MG/DL (ref 70–110)
GLUCOSE UR QL STRIP: NEGATIVE
HCT VFR BLD AUTO: 35.8 % (ref 37–48.5)
HGB BLD-MCNC: 10.4 G/DL (ref 12–16)
HGB UR QL STRIP: NEGATIVE
HYALINE CASTS #/AREA URNS LPF: 2 /LPF
IMM GRANULOCYTES # BLD AUTO: 0.02 K/UL (ref 0–0.04)
IMM GRANULOCYTES NFR BLD AUTO: 0.3 % (ref 0–0.5)
KETONES UR QL STRIP: ABNORMAL
LEUKOCYTE ESTERASE UR QL STRIP: ABNORMAL
LIPASE SERPL-CCNC: 27 U/L (ref 4–60)
LYMPHOCYTES # BLD AUTO: 2.3 K/UL (ref 1–4.8)
LYMPHOCYTES NFR BLD: 37.4 % (ref 18–48)
MCH RBC QN AUTO: 21.3 PG (ref 27–31)
MCHC RBC AUTO-ENTMCNC: 29.1 G/DL (ref 32–36)
MCV RBC AUTO: 73 FL (ref 82–98)
MICROSCOPIC COMMENT: ABNORMAL
MONOCYTES # BLD AUTO: 0.5 K/UL (ref 0.3–1)
MONOCYTES NFR BLD: 7.8 % (ref 4–15)
NEUTROPHILS # BLD AUTO: 3 K/UL (ref 1.8–7.7)
NEUTROPHILS NFR BLD: 50.5 % (ref 38–73)
NITRITE UR QL STRIP: NEGATIVE
NRBC BLD-RTO: 0 /100 WBC
PH UR STRIP: 6 [PH] (ref 5–8)
PLATELET # BLD AUTO: 374 K/UL (ref 150–450)
PMV BLD AUTO: 10.4 FL (ref 9.2–12.9)
POTASSIUM SERPL-SCNC: 3.9 MMOL/L (ref 3.5–5.1)
PROT SERPL-MCNC: 8 G/DL (ref 6–8.4)
PROT UR QL STRIP: ABNORMAL
RBC # BLD AUTO: 4.89 M/UL (ref 4–5.4)
RBC #/AREA URNS HPF: 1 /HPF (ref 0–4)
SARS-COV-2 RDRP RESP QL NAA+PROBE: NEGATIVE
SARS-COV-2 RNA RESP QL NAA+PROBE: NOT DETECTED
SARS-COV-2- CYCLE NUMBER: -1
SODIUM SERPL-SCNC: 137 MMOL/L (ref 136–145)
SP GR UR STRIP: >1.03 (ref 1–1.03)
SQUAMOUS #/AREA URNS HPF: 9 /HPF
URN SPEC COLLECT METH UR: ABNORMAL
UROBILINOGEN UR STRIP-ACNC: ABNORMAL EU/DL
WBC # BLD AUTO: 6.01 K/UL (ref 3.9–12.7)
WBC #/AREA URNS HPF: 1 /HPF (ref 0–5)

## 2021-08-17 PROCEDURE — 81025 URINE PREGNANCY TEST: CPT | Performed by: PHYSICIAN ASSISTANT

## 2021-08-17 PROCEDURE — 96361 HYDRATE IV INFUSION ADD-ON: CPT

## 2021-08-17 PROCEDURE — 96374 THER/PROPH/DIAG INJ IV PUSH: CPT

## 2021-08-17 PROCEDURE — 83690 ASSAY OF LIPASE: CPT | Performed by: PHYSICIAN ASSISTANT

## 2021-08-17 PROCEDURE — 80053 COMPREHEN METABOLIC PANEL: CPT | Performed by: PHYSICIAN ASSISTANT

## 2021-08-17 PROCEDURE — 99284 EMERGENCY DEPT VISIT MOD MDM: CPT | Mod: 25

## 2021-08-17 PROCEDURE — 96375 TX/PRO/DX INJ NEW DRUG ADDON: CPT

## 2021-08-17 PROCEDURE — U0002 COVID-19 LAB TEST NON-CDC: HCPCS | Performed by: PHYSICIAN ASSISTANT

## 2021-08-17 PROCEDURE — 81000 URINALYSIS NONAUTO W/SCOPE: CPT | Performed by: PHYSICIAN ASSISTANT

## 2021-08-17 PROCEDURE — 63600175 PHARM REV CODE 636 W HCPCS: Performed by: PHYSICIAN ASSISTANT

## 2021-08-17 PROCEDURE — 85025 COMPLETE CBC W/AUTO DIFF WBC: CPT | Performed by: PHYSICIAN ASSISTANT

## 2021-08-17 PROCEDURE — 25000003 PHARM REV CODE 250: Performed by: PHYSICIAN ASSISTANT

## 2021-08-17 RX ORDER — PANTOPRAZOLE SODIUM 20 MG/1
20 TABLET, DELAYED RELEASE ORAL DAILY
Qty: 30 TABLET | Refills: 0 | Status: SHIPPED | OUTPATIENT
Start: 2021-08-17 | End: 2022-08-17

## 2021-08-17 RX ORDER — ONDANSETRON 2 MG/ML
4 INJECTION INTRAMUSCULAR; INTRAVENOUS
Status: COMPLETED | OUTPATIENT
Start: 2021-08-17 | End: 2021-08-17

## 2021-08-17 RX ORDER — KETOROLAC TROMETHAMINE 30 MG/ML
15 INJECTION, SOLUTION INTRAMUSCULAR; INTRAVENOUS
Status: COMPLETED | OUTPATIENT
Start: 2021-08-17 | End: 2021-08-17

## 2021-08-17 RX ADMIN — KETOROLAC TROMETHAMINE 15 MG: 30 INJECTION, SOLUTION INTRAMUSCULAR; INTRAVENOUS at 01:08

## 2021-08-17 RX ADMIN — ONDANSETRON 4 MG: 2 INJECTION INTRAMUSCULAR; INTRAVENOUS at 01:08

## 2021-08-17 RX ADMIN — SODIUM CHLORIDE 1000 ML: 0.9 INJECTION, SOLUTION INTRAVENOUS at 01:08

## 2024-03-14 ENCOUNTER — TELEPHONE (OUTPATIENT)
Dept: SURGERY | Facility: CLINIC | Age: 34
End: 2024-03-14
Payer: MEDICAID

## 2024-03-14 NOTE — TELEPHONE ENCOUNTER
----- Message from Kenya Zuñiga sent at 3/14/2024  8:24 AM CDT -----  Type:  Patient Returning Call    Who Called:Pt   Who Left Message for Patient:  Does the patient know what this is regarding?:yes   Would the patient rather a call back or a response via [facility]? Call back   Best Call Back Number:[phone]  Additional Information: calling about message received about her insurance           03/14/2024                          0826  Message given to Sophie  to contact.

## 2024-05-31 ENCOUNTER — HOSPITAL ENCOUNTER (EMERGENCY)
Facility: HOSPITAL | Age: 34
Discharge: HOME | End: 2024-05-31
Attending: STUDENT IN AN ORGANIZED HEALTH CARE EDUCATION/TRAINING PROGRAM
Payer: MEDICAID

## 2024-05-31 VITALS
SYSTOLIC BLOOD PRESSURE: 104 MMHG | OXYGEN SATURATION: 100 % | BODY MASS INDEX: 20.4 KG/M2 | RESPIRATION RATE: 18 BRPM | WEIGHT: 130 LBS | HEART RATE: 79 BPM | HEIGHT: 67 IN | DIASTOLIC BLOOD PRESSURE: 60 MMHG | TEMPERATURE: 99 F

## 2024-05-31 DIAGNOSIS — M89.8X1 PAIN OF RIGHT SCAPULA: ICD-10-CM

## 2024-05-31 DIAGNOSIS — S46.811S TRAPEZIUS MUSCLE STRAIN, RIGHT, SEQUELA: Primary | ICD-10-CM

## 2024-05-31 DIAGNOSIS — M25.511 CHRONIC RIGHT SHOULDER PAIN: ICD-10-CM

## 2024-05-31 DIAGNOSIS — G89.29 CHRONIC RIGHT SHOULDER PAIN: ICD-10-CM

## 2024-05-31 LAB
B-HCG UR QL: NEGATIVE
CTP QC/QA: YES

## 2024-05-31 PROCEDURE — 96372 THER/PROPH/DIAG INJ SC/IM: CPT | Performed by: NURSE PRACTITIONER

## 2024-05-31 PROCEDURE — 25000003 PHARM REV CODE 250: Performed by: NURSE PRACTITIONER

## 2024-05-31 PROCEDURE — 99284 EMERGENCY DEPT VISIT MOD MDM: CPT | Mod: 25

## 2024-05-31 PROCEDURE — 81025 URINE PREGNANCY TEST: CPT | Performed by: NURSE PRACTITIONER

## 2024-05-31 PROCEDURE — 63600175 PHARM REV CODE 636 W HCPCS: Performed by: NURSE PRACTITIONER

## 2024-05-31 RX ORDER — METHOCARBAMOL 750 MG/1
750 TABLET, FILM COATED ORAL 3 TIMES DAILY
Qty: 15 TABLET | Refills: 0 | Status: SHIPPED | OUTPATIENT
Start: 2024-05-31 | End: 2024-06-05

## 2024-05-31 RX ORDER — METHOCARBAMOL 500 MG/1
500 TABLET, FILM COATED ORAL
Status: COMPLETED | OUTPATIENT
Start: 2024-05-31 | End: 2024-05-31

## 2024-05-31 RX ORDER — IBUPROFEN 800 MG/1
800 TABLET ORAL EVERY 6 HOURS PRN
Qty: 20 TABLET | Refills: 0 | Status: SHIPPED | OUTPATIENT
Start: 2024-05-31

## 2024-05-31 RX ORDER — LIDOCAINE 50 MG/G
1 PATCH TOPICAL
Status: DISCONTINUED | OUTPATIENT
Start: 2024-05-31 | End: 2024-05-31 | Stop reason: HOSPADM

## 2024-05-31 RX ORDER — KETOROLAC TROMETHAMINE 30 MG/ML
30 INJECTION, SOLUTION INTRAMUSCULAR; INTRAVENOUS
Status: COMPLETED | OUTPATIENT
Start: 2024-05-31 | End: 2024-05-31

## 2024-05-31 RX ORDER — LIDOCAINE 50 MG/G
1 PATCH TOPICAL DAILY
Qty: 7 PATCH | Refills: 0 | Status: SHIPPED | OUTPATIENT
Start: 2024-05-31 | End: 2024-06-07

## 2024-05-31 RX ADMIN — METHOCARBAMOL 500 MG: 500 TABLET ORAL at 10:05

## 2024-05-31 RX ADMIN — LIDOCAINE 5% 1 PATCH: 700 PATCH TOPICAL at 10:05

## 2024-05-31 RX ADMIN — KETOROLAC TROMETHAMINE 30 MG: 30 INJECTION, SOLUTION INTRAMUSCULAR at 10:05

## 2024-05-31 NOTE — ED PROVIDER NOTES
Encounter Date: 2024       History     Chief Complaint   Patient presents with    Back Pain     Pt reports right upper back pain, radiating to right shoulder and underneath her arm, x 1 month. States she does do heavy lifting at work.     [age] presents emergency room with reports right upper back pain radiating into the right shoulder.  Patient states she was seen at an another facility approximately 2 weeks ago for similar presentation and was diagnosed with muscle strain.  Patient reports taking the medications as prescribed with minimal improvement in her condition.  She does state that she took ibuprofen last night which did allow for her to go to sleep without any pain.  Patient denies any chest pain or shortness of breath.  She denies any known injury however states she does lift up on patient's for work.  Patient has no past medical history reported.  See physical examination.    The history is provided by the patient. No  was used.     Review of patient's allergies indicates:  No Known Allergies  History reviewed. No pertinent past medical history.  Past Surgical History:   Procedure Laterality Date     SECTION, LOW TRANSVERSE      failed induction for oligo    EYE SURGERY      on right eye stye     No family history on file.  Social History     Tobacco Use    Smoking status: Never    Smokeless tobacco: Never   Substance Use Topics    Alcohol use: Yes     Comment: occasionally for social events    Drug use: No     Review of Systems   Musculoskeletal:  Positive for back pain.        Right shoulder   All other systems reviewed and are negative.      Physical Exam     Initial Vitals [24 0947]   BP Pulse Resp Temp SpO2   104/60 79 18 98.6 °F (37 °C) 100 %      MAP       --         Physical Exam    Constitutional: She appears well-developed and well-nourished.   HENT:   Head: Normocephalic.   Right Ear: Hearing and tympanic membrane normal.   Left Ear: Hearing  and tympanic membrane normal.   Nose: Nose normal.   Mouth/Throat: Oropharynx is clear and moist.   Eyes: Lids are normal. Pupils are equal, round, and reactive to light.   Neck:   Normal range of motion.  Cardiovascular:  Normal rate.           Pulmonary/Chest: Breath sounds normal. No respiratory distress. She has no wheezes. She has no rhonchi.   Abdominal: Abdomen is soft. There is no abdominal tenderness.   Musculoskeletal:         General: Normal range of motion.      Cervical back: Normal range of motion. No rigidity.        Back:       Comments: Tenderness noted to the marked location.  There is no erythema nor swelling noted.  There is no rash present. No spinal tenderness, no step offs.      Neurological: She is alert and oriented to person, place, and time.   Skin: Skin is warm and dry. No rash noted.   Psychiatric: She has a normal mood and affect. Her behavior is normal. Judgment and thought content normal.         ED Course   Procedures  Labs Reviewed   POCT URINE PREGNANCY          Imaging Results              X-Ray Scapula Right (Final result)  Result time 05/31/24 10:58:36      Final result by Aj Gray MD (05/31/24 10:58:36)                   Impression:      No convincing evidence of acute fracture or dislocation.      Electronically signed by: Aj Gray  Date:    05/31/2024  Time:    10:58               Narrative:    EXAMINATION:  XR SCAPULA RIGHT    CLINICAL HISTORY:  Other specified disorders of bone, shoulder    TECHNIQUE:  Two views right scapula.    COMPARISON:  None    FINDINGS:  No convincing evidence of acute displaced fracture or dislocation.    No evidence of radiopaque foreign body.    No acute findings the visualized portions the chest.                                       Medications   LIDOcaine 5 % patch 1 patch (1 patch Transdermal Patch Applied 5/31/24 1007)   methocarbamoL tablet 500 mg (500 mg Oral Given 5/31/24 1007)   ketorolac injection 30 mg (30 mg  Intramuscular Given 5/31/24 1007)     Medical Decision Making  Differential Diagnosis includes, but is not limited to:  Fracture, dislocation, compartment syndrome, nerve injury/palsy, vascular injury, septic joint, cellulitis, bursitis, muscle strain, ligament tear/sprain, soft tissue contusion, osteoarthritis.       Amount and/or Complexity of Data Reviewed  Labs: ordered. Decision-making details documented in ED Course.  Radiology: ordered.    Risk  Prescription drug management.               ED Course as of 05/31/24 1128   Fri May 31, 2024   1022 POCT urine pregnancy [DT]   1110 FINDINGS:  No convincing evidence of acute displaced fracture or dislocation.     No evidence of radiopaque foreign body.     No acute findings the visualized portions the chest.     Impression:     No convincing evidence of acute fracture or dislocati   [DT]   1124 Independent interpretation of the x-ray identifies no acute fracture.  The patient has been notified to follow up with the primary care provider in addition to orthopedics.  She will be placed on ibuprofen, Robaxin, and Lidoderm patches.  She has also been encouraged to not lift upon anything heavy for the next few days.  Patient reports some improvement in her condition after the medications were given.  Strict return precautions have been given otherwise she is stable at this time for discharge. [DT]      ED Course User Index  [DT] Susan Khan NP                           Clinical Impression:  Final diagnoses:  [M89.8X1] Pain of right scapula  [S46.811S] Trapezius muscle strain, right, sequela (Primary)  [M25.511, G89.29] Chronic right shoulder pain          ED Disposition Condition    Discharge Stable          ED Prescriptions       Medication Sig Dispense Start Date End Date Auth. Provider    methocarbamoL (ROBAXIN) 750 MG Tab Take 1 tablet (750 mg total) by mouth 3 (three) times daily. for 5 days 15 tablet 5/31/2024 6/5/2024 Susan Khan NP    LIDOcaine  (LIDODERM) 5 % Place 1 patch onto the skin once daily. Remove & Discard patch within 12 hours or as directed by MD for 7 days 7 patch 5/31/2024 6/7/2024 Susan Khan NP    ibuprofen (ADVIL,MOTRIN) 800 MG tablet Take 1 tablet (800 mg total) by mouth every 6 (six) hours as needed for Pain. 20 tablet 5/31/2024 -- Susan Khan NP          Follow-up Information       Follow up With Specialties Details Why Contact Info    Violeta Gallardo MD Family Medicine Schedule an appointment as soon as possible for a visit in 2 days  [address] [facility] [address]  [phone]      Nba Oconnell MD Orthopedic Surgery Schedule an appointment as soon as possible for a visit in 3 days  [address] [facility]  [address]  [facility] [address]  [phone]               Susan Khan NP  05/31/24 1127       Susan Khan NP  05/31/24 1128

## 2024-05-31 NOTE — Clinical Note
"Estelle Hornedara Mayer was seen and treated in our emergency department on 5/31/2024.  She may return to work on 06/03/2024.       If you have any questions or concerns, please don't hesitate to call.      Susan Khan NP"
"Estelle Hornedara Mayer was seen and treated in our emergency department on 5/31/2024.  She may return to work on 06/03/2024.       If you have any questions or concerns, please don't hesitate to call.      Susan Khan NP"
Xray Chest 1 View AP/PA

## 2024-05-31 NOTE — DISCHARGE INSTRUCTIONS

## 2025-07-27 ENCOUNTER — HOSPITAL ENCOUNTER (OUTPATIENT)
Facility: HOSPITAL | Age: 35
Discharge: HOME | End: 2025-07-28
Attending: STUDENT IN AN ORGANIZED HEALTH CARE EDUCATION/TRAINING PROGRAM | Admitting: STUDENT IN AN ORGANIZED HEALTH CARE EDUCATION/TRAINING PROGRAM

## 2025-07-27 DIAGNOSIS — R10.9 ABDOMINAL PAIN, UNSPECIFIED ABDOMINAL LOCATION: ICD-10-CM

## 2025-07-27 DIAGNOSIS — K35.80 ACUTE APPENDICITIS: Primary | ICD-10-CM

## 2025-07-27 DIAGNOSIS — Z13.6 SCREENING FOR CARDIOVASCULAR CONDITION: ICD-10-CM

## 2025-07-27 PROBLEM — K35.30 ACUTE APPENDICITIS WITH LOCALIZED PERITONITIS: Status: ACTIVE | Noted: 2025-07-27

## 2025-07-27 LAB
ABSOLUTE EOSINOPHIL (OHS): 0.05 K/UL
ABSOLUTE MONOCYTE (OHS): 0.66 K/UL (ref 0.3–1)
ABSOLUTE NEUTROPHIL COUNT (OHS): 8.16 K/UL (ref 1.8–7.7)
ALBUMIN SERPL BCP-MCNC: 4 G/DL (ref 3.5–5.2)
ALP SERPL-CCNC: 57 UNIT/L (ref 40–150)
ALT SERPL W/O P-5'-P-CCNC: 10 UNIT/L (ref 10–44)
ANION GAP (OHS): 8 MMOL/L (ref 8–16)
APTT PPP: 25.9 SECONDS (ref 21–32)
AST SERPL-CCNC: 18 UNIT/L (ref 11–45)
B-HCG UR QL: NEGATIVE
BASOPHILS # BLD AUTO: 0.03 K/UL
BASOPHILS NFR BLD AUTO: 0.3 %
BILIRUB SERPL-MCNC: 1.3 MG/DL (ref 0.1–1)
BILIRUB UR QL STRIP.AUTO: NEGATIVE
BUN SERPL-MCNC: 7 MG/DL (ref 6–20)
CALCIUM SERPL-MCNC: 9.6 MG/DL (ref 8.7–10.5)
CHLORIDE SERPL-SCNC: 106 MMOL/L (ref 95–110)
CLARITY UR: CLEAR
CO2 SERPL-SCNC: 25 MMOL/L (ref 23–29)
COLOR UR AUTO: YELLOW
CREAT SERPL-MCNC: 0.7 MG/DL (ref 0.5–1.4)
CRP SERPL-MCNC: 6.2 MG/L
ERYTHROCYTE [DISTWIDTH] IN BLOOD BY AUTOMATED COUNT: 17.5 % (ref 11.5–14.5)
GFR SERPLBLD CREATININE-BSD FMLA CKD-EPI: >60 ML/MIN/1.73/M2
GLUCOSE SERPL-MCNC: 95 MG/DL (ref 70–110)
GLUCOSE UR QL STRIP: NEGATIVE
HCT VFR BLD AUTO: 31.8 % (ref 37–48.5)
HGB BLD-MCNC: 9 GM/DL (ref 12–16)
HGB UR QL STRIP: NEGATIVE
IMM GRANULOCYTES # BLD AUTO: 0.03 K/UL (ref 0–0.04)
IMM GRANULOCYTES NFR BLD AUTO: 0.3 % (ref 0–0.5)
INDIRECT COOMBS: NORMAL
INR PPP: 1 (ref 0.8–1.2)
KETONES UR QL STRIP: NEGATIVE
LACTATE SERPL-SCNC: 1.9 MMOL/L (ref 0.5–2.2)
LDH SERPL L TO P-CCNC: 1.7 MMOL/L (ref 0.5–2.2)
LEUKOCYTE ESTERASE UR QL STRIP: NEGATIVE
LIPASE SERPL-CCNC: 14 U/L (ref 4–60)
LYMPHOCYTES # BLD AUTO: 1.55 K/UL (ref 1–4.8)
MCH RBC QN AUTO: 19.6 PG (ref 27–31)
MCHC RBC AUTO-ENTMCNC: 28.3 G/DL (ref 32–36)
MCV RBC AUTO: 69 FL (ref 82–98)
NITRITE UR QL STRIP: NEGATIVE
NUCLEATED RBC (/100WBC) (OHS): 0 /100 WBC
PCO2 BLDA: 42.6 MMHG (ref 35–45)
PH SMN: 7.43 [PH] (ref 7.35–7.45)
PH UR STRIP: 7 [PH]
PLATELET # BLD AUTO: 383 K/UL (ref 150–450)
PMV BLD AUTO: 9.6 FL (ref 9.2–12.9)
PO2 BLDA: 21.3 MMHG (ref 40–60)
POC BASE DEFICIT: 3.3 MMOL/L (ref -2–2)
POC HCO3: 28.1 MMOL/L (ref 24–28)
POC PERFORMED BY: ABNORMAL
POC SATURATED O2: 27.4 % (ref 95–100)
POTASSIUM SERPL-SCNC: 3.9 MMOL/L (ref 3.5–5.1)
PROT SERPL-MCNC: 7.3 GM/DL (ref 6–8.4)
PROT UR QL STRIP: NEGATIVE
PROTHROMBIN TIME: 11.5 SECONDS (ref 9–12.5)
RBC # BLD AUTO: 4.6 M/UL (ref 4–5.4)
RELATIVE EOSINOPHIL (OHS): 0.5 %
RELATIVE LYMPHOCYTE (OHS): 14.8 % (ref 18–48)
RELATIVE MONOCYTE (OHS): 6.3 % (ref 4–15)
RELATIVE NEUTROPHIL (OHS): 77.8 % (ref 38–73)
RH BLD: NORMAL
SODIUM SERPL-SCNC: 139 MMOL/L (ref 136–145)
SP GR UR STRIP: 1.01
SPECIMEN OUTDATE: NORMAL
SPECIMEN SOURCE: ABNORMAL
UROBILINOGEN UR STRIP-ACNC: NEGATIVE EU/DL
WBC # BLD AUTO: 10.48 K/UL (ref 3.9–12.7)

## 2025-07-27 PROCEDURE — 85610 PROTHROMBIN TIME: CPT | Performed by: STUDENT IN AN ORGANIZED HEALTH CARE EDUCATION/TRAINING PROGRAM

## 2025-07-27 PROCEDURE — 96365 THER/PROPH/DIAG IV INF INIT: CPT

## 2025-07-27 PROCEDURE — 25000003 PHARM REV CODE 250: Performed by: STUDENT IN AN ORGANIZED HEALTH CARE EDUCATION/TRAINING PROGRAM

## 2025-07-27 PROCEDURE — 99222 1ST HOSP IP/OBS MODERATE 55: CPT | Mod: 57,,, | Performed by: STUDENT IN AN ORGANIZED HEALTH CARE EDUCATION/TRAINING PROGRAM

## 2025-07-27 PROCEDURE — 25500020 PHARM REV CODE 255: Performed by: STUDENT IN AN ORGANIZED HEALTH CARE EDUCATION/TRAINING PROGRAM

## 2025-07-27 PROCEDURE — 96375 TX/PRO/DX INJ NEW DRUG ADDON: CPT

## 2025-07-27 PROCEDURE — 81025 URINE PREGNANCY TEST: CPT | Performed by: STUDENT IN AN ORGANIZED HEALTH CARE EDUCATION/TRAINING PROGRAM

## 2025-07-27 PROCEDURE — 83605 ASSAY OF LACTIC ACID: CPT

## 2025-07-27 PROCEDURE — 82803 BLOOD GASES ANY COMBINATION: CPT

## 2025-07-27 PROCEDURE — 85730 THROMBOPLASTIN TIME PARTIAL: CPT | Performed by: STUDENT IN AN ORGANIZED HEALTH CARE EDUCATION/TRAINING PROGRAM

## 2025-07-27 PROCEDURE — 85025 COMPLETE CBC W/AUTO DIFF WBC: CPT | Performed by: STUDENT IN AN ORGANIZED HEALTH CARE EDUCATION/TRAINING PROGRAM

## 2025-07-27 PROCEDURE — 93010 ELECTROCARDIOGRAM REPORT: CPT | Mod: ,,, | Performed by: STUDENT IN AN ORGANIZED HEALTH CARE EDUCATION/TRAINING PROGRAM

## 2025-07-27 PROCEDURE — 86901 BLOOD TYPING SEROLOGIC RH(D): CPT | Performed by: STUDENT IN AN ORGANIZED HEALTH CARE EDUCATION/TRAINING PROGRAM

## 2025-07-27 PROCEDURE — 83605 ASSAY OF LACTIC ACID: CPT | Performed by: STUDENT IN AN ORGANIZED HEALTH CARE EDUCATION/TRAINING PROGRAM

## 2025-07-27 PROCEDURE — 99900035 HC TECH TIME PER 15 MIN (STAT)

## 2025-07-27 PROCEDURE — 87040 BLOOD CULTURE FOR BACTERIA: CPT | Mod: 91 | Performed by: STUDENT IN AN ORGANIZED HEALTH CARE EDUCATION/TRAINING PROGRAM

## 2025-07-27 PROCEDURE — G0378 HOSPITAL OBSERVATION PER HR: HCPCS

## 2025-07-27 PROCEDURE — 86140 C-REACTIVE PROTEIN: CPT | Performed by: STUDENT IN AN ORGANIZED HEALTH CARE EDUCATION/TRAINING PROGRAM

## 2025-07-27 PROCEDURE — 80053 COMPREHEN METABOLIC PANEL: CPT | Performed by: STUDENT IN AN ORGANIZED HEALTH CARE EDUCATION/TRAINING PROGRAM

## 2025-07-27 PROCEDURE — 83690 ASSAY OF LIPASE: CPT | Performed by: STUDENT IN AN ORGANIZED HEALTH CARE EDUCATION/TRAINING PROGRAM

## 2025-07-27 PROCEDURE — 93005 ELECTROCARDIOGRAM TRACING: CPT

## 2025-07-27 PROCEDURE — 63600175 PHARM REV CODE 636 W HCPCS: Performed by: STUDENT IN AN ORGANIZED HEALTH CARE EDUCATION/TRAINING PROGRAM

## 2025-07-27 PROCEDURE — 99285 EMERGENCY DEPT VISIT HI MDM: CPT | Mod: 25

## 2025-07-27 PROCEDURE — 81003 URINALYSIS AUTO W/O SCOPE: CPT | Performed by: STUDENT IN AN ORGANIZED HEALTH CARE EDUCATION/TRAINING PROGRAM

## 2025-07-27 PROCEDURE — 96361 HYDRATE IV INFUSION ADD-ON: CPT

## 2025-07-27 RX ORDER — ONDANSETRON HYDROCHLORIDE 2 MG/ML
8 INJECTION, SOLUTION INTRAVENOUS
Status: COMPLETED | OUTPATIENT
Start: 2025-07-27 | End: 2025-07-27

## 2025-07-27 RX ORDER — HYDROMORPHONE HYDROCHLORIDE 1 MG/ML
0.5 INJECTION, SOLUTION INTRAMUSCULAR; INTRAVENOUS; SUBCUTANEOUS EVERY 6 HOURS PRN
Status: DISCONTINUED | OUTPATIENT
Start: 2025-07-27 | End: 2025-07-27

## 2025-07-27 RX ORDER — HYDROMORPHONE HYDROCHLORIDE 1 MG/ML
0.5 INJECTION, SOLUTION INTRAMUSCULAR; INTRAVENOUS; SUBCUTANEOUS EVERY 4 HOURS PRN
Status: DISCONTINUED | OUTPATIENT
Start: 2025-07-27 | End: 2025-07-28 | Stop reason: HOSPADM

## 2025-07-27 RX ORDER — OXYCODONE HYDROCHLORIDE 5 MG/1
5 TABLET ORAL EVERY 4 HOURS PRN
Refills: 0 | Status: DISCONTINUED | OUTPATIENT
Start: 2025-07-27 | End: 2025-07-28 | Stop reason: HOSPADM

## 2025-07-27 RX ORDER — SODIUM CHLORIDE, SODIUM LACTATE, POTASSIUM CHLORIDE, CALCIUM CHLORIDE 600; 310; 30; 20 MG/100ML; MG/100ML; MG/100ML; MG/100ML
INJECTION, SOLUTION INTRAVENOUS CONTINUOUS
Status: DISCONTINUED | OUTPATIENT
Start: 2025-07-28 | End: 2025-07-28 | Stop reason: HOSPADM

## 2025-07-27 RX ORDER — HYDROMORPHONE HYDROCHLORIDE 1 MG/ML
1 INJECTION, SOLUTION INTRAMUSCULAR; INTRAVENOUS; SUBCUTANEOUS
Refills: 0 | Status: COMPLETED | OUTPATIENT
Start: 2025-07-27 | End: 2025-07-27

## 2025-07-27 RX ORDER — ACETAMINOPHEN 500 MG
1000 TABLET ORAL EVERY 8 HOURS
Status: DISCONTINUED | OUTPATIENT
Start: 2025-07-28 | End: 2025-07-28 | Stop reason: HOSPADM

## 2025-07-27 RX ORDER — ONDANSETRON HYDROCHLORIDE 2 MG/ML
4 INJECTION, SOLUTION INTRAVENOUS EVERY 6 HOURS PRN
Status: DISCONTINUED | OUTPATIENT
Start: 2025-07-27 | End: 2025-07-28 | Stop reason: HOSPADM

## 2025-07-27 RX ADMIN — ONDANSETRON 8 MG: 2 INJECTION INTRAMUSCULAR; INTRAVENOUS at 09:07

## 2025-07-27 RX ADMIN — HYDROMORPHONE HYDROCHLORIDE 1 MG: 1 INJECTION, SOLUTION INTRAMUSCULAR; INTRAVENOUS; SUBCUTANEOUS at 09:07

## 2025-07-27 RX ADMIN — IOHEXOL 75 ML: 350 INJECTION, SOLUTION INTRAVENOUS at 09:07

## 2025-07-27 RX ADMIN — SODIUM CHLORIDE, POTASSIUM CHLORIDE, SODIUM LACTATE AND CALCIUM CHLORIDE: 600; 310; 30; 20 INJECTION, SOLUTION INTRAVENOUS at 11:07

## 2025-07-27 RX ADMIN — PIPERACILLIN SODIUM AND TAZOBACTAM SODIUM 4.5 G: 4; .5 INJECTION, POWDER, LYOPHILIZED, FOR SOLUTION INTRAVENOUS at 09:07

## 2025-07-27 RX ADMIN — SODIUM CHLORIDE 1770 ML: 0.9 INJECTION, SOLUTION INTRAVENOUS at 09:07

## 2025-07-27 NOTE — LETTER
July 28, 2025         [address] [place] [address] [place] [address]  Phone: [phone]  Fax: [phone]       Patient: Estelle Mayer   YOB: 1990  Date of Visit: 07/28/2025    To Whom It May Concern:    Haleigh Mayer  was at [facility] on 07/28/2025. The patient may return to work/school with restrictions. No lifting over 20 pounds for 2 weeks.      Sincerely,    Dr. Ebenezer Pearl, RN

## 2025-07-28 ENCOUNTER — ANESTHESIA EVENT (OUTPATIENT)
Dept: SURGERY | Facility: HOSPITAL | Age: 35
End: 2025-07-28

## 2025-07-28 ENCOUNTER — ANESTHESIA (OUTPATIENT)
Dept: SURGERY | Facility: HOSPITAL | Age: 35
End: 2025-07-28

## 2025-07-28 VITALS
RESPIRATION RATE: 15 BRPM | HEART RATE: 72 BPM | DIASTOLIC BLOOD PRESSURE: 58 MMHG | TEMPERATURE: 98 F | BODY MASS INDEX: 19.71 KG/M2 | HEIGHT: 68 IN | SYSTOLIC BLOOD PRESSURE: 100 MMHG | WEIGHT: 130.06 LBS | OXYGEN SATURATION: 100 %

## 2025-07-28 LAB
ABSOLUTE EOSINOPHIL (OHS): 0.01 K/UL
ABSOLUTE MONOCYTE (OHS): 1 K/UL (ref 0.3–1)
ABSOLUTE NEUTROPHIL COUNT (OHS): 14.03 K/UL (ref 1.8–7.7)
ALBUMIN SERPL BCP-MCNC: 3.7 G/DL (ref 3.5–5.2)
ALP SERPL-CCNC: 50 UNIT/L (ref 40–150)
ALT SERPL W/O P-5'-P-CCNC: 9 UNIT/L (ref 10–44)
ANION GAP (OHS): 9 MMOL/L (ref 8–16)
AST SERPL-CCNC: 21 UNIT/L (ref 11–45)
BASOPHILS # BLD AUTO: 0.03 K/UL
BASOPHILS NFR BLD AUTO: 0.2 %
BILIRUB SERPL-MCNC: 1.5 MG/DL (ref 0.1–1)
BUN SERPL-MCNC: 5 MG/DL (ref 6–20)
CALCIUM SERPL-MCNC: 9 MG/DL (ref 8.7–10.5)
CHLORIDE SERPL-SCNC: 107 MMOL/L (ref 95–110)
CO2 SERPL-SCNC: 24 MMOL/L (ref 23–29)
CREAT SERPL-MCNC: 0.6 MG/DL (ref 0.5–1.4)
ERYTHROCYTE [DISTWIDTH] IN BLOOD BY AUTOMATED COUNT: 17.4 % (ref 11.5–14.5)
GFR SERPLBLD CREATININE-BSD FMLA CKD-EPI: >60 ML/MIN/1.73/M2
GLUCOSE SERPL-MCNC: 106 MG/DL (ref 70–110)
HCT VFR BLD AUTO: 30.6 % (ref 37–48.5)
HGB BLD-MCNC: 8.5 GM/DL (ref 12–16)
HOLD SPECIMEN: NORMAL
HOLD SPECIMEN: NORMAL
IMM GRANULOCYTES # BLD AUTO: 0.06 K/UL (ref 0–0.04)
IMM GRANULOCYTES NFR BLD AUTO: 0.4 % (ref 0–0.5)
LYMPHOCYTES # BLD AUTO: 0.86 K/UL (ref 1–4.8)
MAGNESIUM SERPL-MCNC: 1.7 MG/DL (ref 1.6–2.6)
MCH RBC QN AUTO: 19.6 PG (ref 27–31)
MCHC RBC AUTO-ENTMCNC: 27.8 G/DL (ref 32–36)
MCV RBC AUTO: 71 FL (ref 82–98)
NUCLEATED RBC (/100WBC) (OHS): 0 /100 WBC
PHOSPHATE SERPL-MCNC: 2.9 MG/DL (ref 2.7–4.5)
PLATELET # BLD AUTO: 264 K/UL (ref 150–450)
PMV BLD AUTO: 11.1 FL (ref 9.2–12.9)
POTASSIUM SERPL-SCNC: 3.7 MMOL/L (ref 3.5–5.1)
PROT SERPL-MCNC: 7 GM/DL (ref 6–8.4)
RBC # BLD AUTO: 4.33 M/UL (ref 4–5.4)
RELATIVE EOSINOPHIL (OHS): 0.1 %
RELATIVE LYMPHOCYTE (OHS): 5.4 % (ref 18–48)
RELATIVE MONOCYTE (OHS): 6.3 % (ref 4–15)
RELATIVE NEUTROPHIL (OHS): 87.6 % (ref 38–73)
SODIUM SERPL-SCNC: 140 MMOL/L (ref 136–145)
WBC # BLD AUTO: 15.99 K/UL (ref 3.9–12.7)

## 2025-07-28 PROCEDURE — G0378 HOSPITAL OBSERVATION PER HR: HCPCS

## 2025-07-28 PROCEDURE — 25000003 PHARM REV CODE 250

## 2025-07-28 PROCEDURE — 37000009 HC ANESTHESIA EA ADD 15 MINS: Performed by: STUDENT IN AN ORGANIZED HEALTH CARE EDUCATION/TRAINING PROGRAM

## 2025-07-28 PROCEDURE — 84100 ASSAY OF PHOSPHORUS: CPT

## 2025-07-28 PROCEDURE — 37000008 HC ANESTHESIA 1ST 15 MINUTES: Performed by: STUDENT IN AN ORGANIZED HEALTH CARE EDUCATION/TRAINING PROGRAM

## 2025-07-28 PROCEDURE — 27201423 OPTIME MED/SURG SUP & DEVICES STERILE SUPPLY: Performed by: STUDENT IN AN ORGANIZED HEALTH CARE EDUCATION/TRAINING PROGRAM

## 2025-07-28 PROCEDURE — 85025 COMPLETE CBC W/AUTO DIFF WBC: CPT

## 2025-07-28 PROCEDURE — 88304 TISSUE EXAM BY PATHOLOGIST: CPT | Mod: 26,,, | Performed by: PATHOLOGY

## 2025-07-28 PROCEDURE — 71000016 HC POSTOP RECOV ADDL HR: Performed by: STUDENT IN AN ORGANIZED HEALTH CARE EDUCATION/TRAINING PROGRAM

## 2025-07-28 PROCEDURE — 71000033 HC RECOVERY, INTIAL HOUR: Performed by: STUDENT IN AN ORGANIZED HEALTH CARE EDUCATION/TRAINING PROGRAM

## 2025-07-28 PROCEDURE — 63600175 PHARM REV CODE 636 W HCPCS

## 2025-07-28 PROCEDURE — 96366 THER/PROPH/DIAG IV INF ADDON: CPT

## 2025-07-28 PROCEDURE — 71000015 HC POSTOP RECOV 1ST HR: Performed by: STUDENT IN AN ORGANIZED HEALTH CARE EDUCATION/TRAINING PROGRAM

## 2025-07-28 PROCEDURE — 36000708 HC OR TIME LEV III 1ST 15 MIN: Performed by: STUDENT IN AN ORGANIZED HEALTH CARE EDUCATION/TRAINING PROGRAM

## 2025-07-28 PROCEDURE — 63600175 PHARM REV CODE 636 W HCPCS: Performed by: STUDENT IN AN ORGANIZED HEALTH CARE EDUCATION/TRAINING PROGRAM

## 2025-07-28 PROCEDURE — 25000003 PHARM REV CODE 250: Performed by: STUDENT IN AN ORGANIZED HEALTH CARE EDUCATION/TRAINING PROGRAM

## 2025-07-28 PROCEDURE — 44970 LAPAROSCOPY APPENDECTOMY: CPT | Mod: ,,, | Performed by: STUDENT IN AN ORGANIZED HEALTH CARE EDUCATION/TRAINING PROGRAM

## 2025-07-28 PROCEDURE — 96376 TX/PRO/DX INJ SAME DRUG ADON: CPT

## 2025-07-28 PROCEDURE — 80053 COMPREHEN METABOLIC PANEL: CPT

## 2025-07-28 PROCEDURE — 88304 TISSUE EXAM BY PATHOLOGIST: CPT | Mod: TC | Performed by: STUDENT IN AN ORGANIZED HEALTH CARE EDUCATION/TRAINING PROGRAM

## 2025-07-28 PROCEDURE — 83735 ASSAY OF MAGNESIUM: CPT

## 2025-07-28 PROCEDURE — 71000039 HC RECOVERY, EACH ADD'L HOUR: Performed by: STUDENT IN AN ORGANIZED HEALTH CARE EDUCATION/TRAINING PROGRAM

## 2025-07-28 PROCEDURE — 36000709 HC OR TIME LEV III EA ADD 15 MIN: Performed by: STUDENT IN AN ORGANIZED HEALTH CARE EDUCATION/TRAINING PROGRAM

## 2025-07-28 RX ORDER — ONDANSETRON HYDROCHLORIDE 2 MG/ML
4 INJECTION, SOLUTION INTRAVENOUS DAILY PRN
Status: DISCONTINUED | OUTPATIENT
Start: 2025-07-28 | End: 2025-07-28 | Stop reason: HOSPADM

## 2025-07-28 RX ORDER — GLUCAGON 1 MG
1 KIT INJECTION
Status: DISCONTINUED | OUTPATIENT
Start: 2025-07-28 | End: 2025-07-28 | Stop reason: HOSPADM

## 2025-07-28 RX ORDER — ACETAMINOPHEN 500 MG
1000 TABLET ORAL EVERY 8 HOURS
COMMUNITY
Start: 2025-07-28

## 2025-07-28 RX ORDER — SODIUM CHLORIDE 0.9 % (FLUSH) 0.9 %
10 SYRINGE (ML) INJECTION
Status: DISCONTINUED | OUTPATIENT
Start: 2025-07-28 | End: 2025-07-28 | Stop reason: HOSPADM

## 2025-07-28 RX ORDER — SODIUM CHLORIDE 9 MG/ML
1000 INJECTION, SOLUTION INTRAVENOUS
Status: DISCONTINUED | OUTPATIENT
Start: 2025-07-28 | End: 2025-07-28

## 2025-07-28 RX ORDER — DEXMEDETOMIDINE HYDROCHLORIDE 100 UG/ML
INJECTION, SOLUTION INTRAVENOUS
Status: DISCONTINUED | OUTPATIENT
Start: 2025-07-28 | End: 2025-07-28

## 2025-07-28 RX ORDER — SUCCINYLCHOLINE CHLORIDE 20 MG/ML INJECTION SOLUTION
SOLUTION
Status: DISCONTINUED | OUTPATIENT
Start: 2025-07-28 | End: 2025-07-28

## 2025-07-28 RX ORDER — KETAMINE HCL IN 0.9 % NACL 50 MG/5 ML
SYRINGE (ML) INTRAVENOUS
Status: DISCONTINUED | OUTPATIENT
Start: 2025-07-28 | End: 2025-07-28

## 2025-07-28 RX ORDER — FENTANYL CITRATE 50 UG/ML
INJECTION, SOLUTION INTRAMUSCULAR; INTRAVENOUS
Status: DISCONTINUED | OUTPATIENT
Start: 2025-07-28 | End: 2025-07-28

## 2025-07-28 RX ORDER — HALOPERIDOL LACTATE 5 MG/ML
0.5 INJECTION, SOLUTION INTRAMUSCULAR EVERY 10 MIN PRN
Status: DISCONTINUED | OUTPATIENT
Start: 2025-07-28 | End: 2025-07-28 | Stop reason: HOSPADM

## 2025-07-28 RX ORDER — KETOROLAC TROMETHAMINE 30 MG/ML
INJECTION, SOLUTION INTRAMUSCULAR; INTRAVENOUS
Status: DISCONTINUED | OUTPATIENT
Start: 2025-07-28 | End: 2025-07-28

## 2025-07-28 RX ORDER — PHENYLEPHRINE HCL IN 0.9% NACL 1 MG/10 ML
SYRINGE (ML) INTRAVENOUS
Status: DISCONTINUED | OUTPATIENT
Start: 2025-07-28 | End: 2025-07-28

## 2025-07-28 RX ORDER — BUPIVACAINE HYDROCHLORIDE 5 MG/ML
INJECTION, SOLUTION EPIDURAL; INTRACAUDAL; PERINEURAL
Status: DISCONTINUED | OUTPATIENT
Start: 2025-07-28 | End: 2025-07-28 | Stop reason: HOSPADM

## 2025-07-28 RX ORDER — DEXAMETHASONE SODIUM PHOSPHATE 4 MG/ML
INJECTION, SOLUTION INTRA-ARTICULAR; INTRALESIONAL; INTRAMUSCULAR; INTRAVENOUS; SOFT TISSUE
Status: DISCONTINUED | OUTPATIENT
Start: 2025-07-28 | End: 2025-07-28

## 2025-07-28 RX ORDER — PROPOFOL 10 MG/ML
VIAL (ML) INTRAVENOUS
Status: DISCONTINUED | OUTPATIENT
Start: 2025-07-28 | End: 2025-07-28

## 2025-07-28 RX ORDER — MIDAZOLAM HYDROCHLORIDE 1 MG/ML
INJECTION INTRAMUSCULAR; INTRAVENOUS
Status: DISCONTINUED | OUTPATIENT
Start: 2025-07-28 | End: 2025-07-28

## 2025-07-28 RX ORDER — ROCURONIUM BROMIDE 10 MG/ML
INJECTION, SOLUTION INTRAVENOUS
Status: DISCONTINUED | OUTPATIENT
Start: 2025-07-28 | End: 2025-07-28

## 2025-07-28 RX ORDER — HYDROMORPHONE HYDROCHLORIDE 2 MG/ML
0.2 INJECTION, SOLUTION INTRAMUSCULAR; INTRAVENOUS; SUBCUTANEOUS EVERY 5 MIN PRN
Status: DISCONTINUED | OUTPATIENT
Start: 2025-07-28 | End: 2025-07-28 | Stop reason: HOSPADM

## 2025-07-28 RX ORDER — OXYCODONE HYDROCHLORIDE 5 MG/1
5 TABLET ORAL
Status: DISCONTINUED | OUTPATIENT
Start: 2025-07-28 | End: 2025-07-28 | Stop reason: HOSPADM

## 2025-07-28 RX ORDER — ONDANSETRON HYDROCHLORIDE 2 MG/ML
INJECTION, SOLUTION INTRAVENOUS
Status: DISCONTINUED | OUTPATIENT
Start: 2025-07-28 | End: 2025-07-28

## 2025-07-28 RX ORDER — LIDOCAINE HYDROCHLORIDE 20 MG/ML
INJECTION, SOLUTION EPIDURAL; INFILTRATION; INTRACAUDAL; PERINEURAL
Status: DISCONTINUED | OUTPATIENT
Start: 2025-07-28 | End: 2025-07-28

## 2025-07-28 RX ORDER — OXYCODONE HYDROCHLORIDE 5 MG/1
5 TABLET ORAL EVERY 4 HOURS PRN
Qty: 10 TABLET | Refills: 0 | Status: SHIPPED | OUTPATIENT
Start: 2025-07-28

## 2025-07-28 RX ADMIN — LIDOCAINE HYDROCHLORIDE 80 MG: 20 INJECTION, SOLUTION EPIDURAL; INFILTRATION; INTRACAUDAL; PERINEURAL at 07:07

## 2025-07-28 RX ADMIN — SUGAMMADEX 200 MG: 100 INJECTION, SOLUTION INTRAVENOUS at 08:07

## 2025-07-28 RX ADMIN — Medication 120 MG: at 07:07

## 2025-07-28 RX ADMIN — FENTANYL CITRATE 100 MCG: 50 INJECTION INTRAMUSCULAR; INTRAVENOUS at 07:07

## 2025-07-28 RX ADMIN — DEXMEDETOMIDINE 8 MCG: 200 INJECTION, SOLUTION INTRAVENOUS at 08:07

## 2025-07-28 RX ADMIN — ROCURONIUM BROMIDE 20 MG: 10 INJECTION INTRAVENOUS at 07:07

## 2025-07-28 RX ADMIN — DEXAMETHASONE SODIUM PHOSPHATE 4 MG: 4 INJECTION, SOLUTION INTRA-ARTICULAR; INTRALESIONAL; INTRAMUSCULAR; INTRAVENOUS; SOFT TISSUE at 07:07

## 2025-07-28 RX ADMIN — ROCURONIUM BROMIDE 20 MG: 10 INJECTION INTRAVENOUS at 08:07

## 2025-07-28 RX ADMIN — OXYCODONE HYDROCHLORIDE 5 MG: 5 TABLET ORAL at 04:07

## 2025-07-28 RX ADMIN — ACETAMINOPHEN 1000 MG: 500 TABLET ORAL at 06:07

## 2025-07-28 RX ADMIN — PROPOFOL 50 MG: 10 INJECTION, EMULSION INTRAVENOUS at 07:07

## 2025-07-28 RX ADMIN — ONDANSETRON 4 MG: 2 INJECTION INTRAMUSCULAR; INTRAVENOUS at 08:07

## 2025-07-28 RX ADMIN — ONDANSETRON 4 MG: 2 INJECTION INTRAMUSCULAR; INTRAVENOUS at 12:07

## 2025-07-28 RX ADMIN — MIDAZOLAM 2 MG: 1 INJECTION INTRAMUSCULAR; INTRAVENOUS at 07:07

## 2025-07-28 RX ADMIN — OXYCODONE HYDROCHLORIDE 5 MG: 5 TABLET ORAL at 10:07

## 2025-07-28 RX ADMIN — KETOROLAC TROMETHAMINE 30 MG: 30 INJECTION, SOLUTION INTRAMUSCULAR; INTRAVENOUS at 08:07

## 2025-07-28 RX ADMIN — Medication 50 MCG: at 07:07

## 2025-07-28 RX ADMIN — PROPOFOL 130 MG: 10 INJECTION, EMULSION INTRAVENOUS at 07:07

## 2025-07-28 RX ADMIN — PIPERACILLIN SODIUM AND TAZOBACTAM SODIUM 4.5 G: 4; .5 INJECTION, POWDER, LYOPHILIZED, FOR SOLUTION INTRAVENOUS at 04:07

## 2025-07-28 RX ADMIN — Medication 20 MG: at 07:07

## 2025-07-28 RX ADMIN — HYDROMORPHONE HYDROCHLORIDE 0.5 MG: 1 INJECTION, SOLUTION INTRAMUSCULAR; INTRAVENOUS; SUBCUTANEOUS at 12:07

## 2025-07-28 RX ADMIN — SODIUM CHLORIDE: 0.9 INJECTION, SOLUTION INTRAVENOUS at 07:07

## 2025-07-28 RX ADMIN — ROCURONIUM BROMIDE 10 MG: 10 INJECTION INTRAVENOUS at 07:07

## 2025-07-28 NOTE — ANESTHESIA POSTPROCEDURE EVALUATION
Anesthesia Post Evaluation    Patient: Estelle Mayer    Procedure(s) Performed: Procedure(s) (LRB):  APPENDECTOMY, LAPAROSCOPIC (N/A)    Final Anesthesia Type: general      Patient location during evaluation: PACU  Patient participation: Yes- Able to Participate  Level of consciousness: awake and alert  Post-procedure vital signs: reviewed and stable  Pain management: adequate  Airway patency: patent    PONV status at discharge: No PONV  Anesthetic complications: no      Cardiovascular status: blood pressure returned to baseline  Respiratory status: unassisted  Hydration status: euvolemic  Follow-up not needed.          Vitals Value Taken Time   BP 96/54 07/28/25 10:17   Temp 36.4 °C (97.5 °F) 07/28/25 08:55   Pulse 76 07/28/25 10:18   Resp 16 07/28/25 10:18   SpO2 100 % 07/28/25 10:18   Vitals shown include unfiled device data.      No case tracking events are documented in the log.      Pain/Roberto Score: Pain Rating Prior to Med Admin: 1 (7/28/2025 10:14 AM)  Pain Rating Post Med Admin: 0 (7/28/2025  6:42 AM)  Roberto Score: 9 (7/28/2025  9:40 AM)

## 2025-07-28 NOTE — TRANSFER OF CARE
"Anesthesia Transfer of Care Note    Patient: Estelle Mayer    Procedure(s) Performed: Procedure(s) (LRB):  APPENDECTOMY, LAPAROSCOPIC (N/A)    Patient location: PACU    Anesthesia Type: general    Transport from OR: Transported from OR on 6-10 L/min O2 by face mask with adequate spontaneous ventilation    Post pain: adequate analgesia    Post assessment: no apparent anesthetic complications    Post vital signs: stable    Level of consciousness: awake and alert    Nausea/Vomiting: no nausea/vomiting    Complications: none    Transfer of care protocol was followed      Last vitals: Visit Vitals  BP (!) 95/52   Pulse 76   Temp 36.8 °C (98.3 °F) (Oral)   Resp (!) 21   Ht 5' 8" (1.727 m)   Wt 59 kg (130 lb 1.1 oz)   LMP 07/16/2025   SpO2 100%   Breastfeeding No   BMI 19.78 kg/m²     "

## 2025-07-28 NOTE — PLAN OF CARE
Problem: Adult Inpatient Plan of Care  Goal: Plan of Care Review  Outcome: Ongoing  Flowsheets (Taken 7/28/2025 0103)  Plan of Care Reviewed With: patient     VIRTUAL NURSE:  Cued into patient's room.  Introduced as VN for night shift that will be working with floor nurse and nursing assistant.  Educated on VN's role in patient care.  Plan of care reviewed.  Education per flowsheet.   Informed that staff will round on patient every 2 hours but to use call light for any other needs they may have.  Informed of fall risk and fall precautions; verbalized understanding.  Call light within reach; stretcher siderails up x2.  Opportunity given for questions and questions answered.  Admission assessment questions answered.  Denies complaints or any needs at this time.  Instructed to call for assistance.  Will cont to monitor and intervene as needed.    Labs, notes, orders, and careplan initiated.       07/28/25 0103   Admission Complete   Admission Complete by VN Complete

## 2025-07-28 NOTE — DISCHARGE INSTRUCTIONS
POSTOPERATIVE INSTRUCTIONS FOLLOWING lap appendectomy    The following are post-operative instructions that will help you to recover from your surgery.  Please read over these instructions carefully and contact us if we can answer any of your questions or concerns.    Dressing/Incision care  Your incision is covered with dermabond. This protects the incision from dirt and bacteria. Please leave in place as it will flake off on its own in about 2 weeks.  You may shower the day after surgery.  Do not take a tub bath and do not soak the surgical site.    Activity   You should be able to return to your regular activities 2 days after your surgery.  However, do not engage in strenuous activities or lifting more than 10 pounds for about 6 weeks.    Medication for pain  You may find that over the counter pain medications may be sufficient for your pain. You should alternate scheduled tylenol and ibuprofen on a schedule for the first few days if you have no contraindications for the medications.  You will be given a prescription for pain medication for more severe pain.  You should not drive or operate machinery while taking these.  Do not exceed more than 4000mg of Tylenol per day. Do not use Tylenol if you are taking a narcotic pain medication that includes it such as OxyContin. Do not exceed more than 3200mg of ibuprofen per day. Please take narcotics with food.  Narcotics can cause, or worse, constipation.  You will need to increase your fluid intake, eat high fiber foods (such as fruits and bran) and make sure that you are up and walking. You may need to take an over the counter stool softener for constipation.    Please report the following:  Temperature greater than 101 degrees  Discharge or bad odor from the wound  Excessive bleeding, such as bloody dressing or extreme bruising  Redness at incision and/or drain sites  Swelling or buildup of fluid around incision  Extreme uncontrolled pain  Inability to tolerate food  or liquids    Additional information  You will need a follow up appointment approximately 2 weeks following your surgery.  If this follow-up appointment has not been made, please call the office at the number listed on the discharge paperwork.      ANESTHESIA  -For the first 24 hours after surgery:  Do not drive, use heavy equipment, make important decisions, or drink alcohol  -It is normal to feel sleepy for several hours.  Rest until you are more awake.  -Have someone stay with you, if needed.  They can watch for problems and help keep you safe.  -Some possible post anesthesia side effects include: nausea and vomiting, sore throat and hoarseness, sleepiness, and dizziness.    PAIN  -If you have pain after surgery, pain medicine will help you feel better.  Take it as directed, before pain becomes severe.  Most pain relievers taken by mouth need at least 20-30 minutes to start working.  -Do not drive or drink alcohol while taking pain medicine.  -Pain medication can upset your stomach.  Taking them with a little food may help.  -Other ways to help control pain: elevation, ice, and relaxation  -Call your surgeon if still having unmanageable pain an hour after taking pain medicine.  -Pain medicine can cause constipation.  Taking an over-the counter stool softener while on prescription pain medicine and drinking plenty of fluids can prevent this side effect.  -Call your surgeon if you have severe side effects like: breathing problems, trouble waking up, dizziness, confusion, or severe constipation.    NAUSEA  -Some people have nausea after surgery.  This is often because of anesthesia, pain, pain medicine, or the stress of surgery.  -Do not push yourself to eat.  Start off with clear liquids and soup.  Slowly move to solid foods.  Don't eat fatty, rich, spicy foods at first.  Eat smaller amounts.  -If you develop persistent nausea and vomiting please notify your surgeon immediately.    BLEEDING  -Different types of  surgery require different types of care and dressing changes.  It is important to follow all instructions and advice from your surgeon.  Change dressing as directed.  Call your surgeon for any concerns regarding postop bleeding.    SIGNS OF INFECTION  -Signs of infection include: fever, swelling, drainage, and redness  -Notify your surgeon if you have a fever of 100.4 F (38.0 C) or higher.  -Notify your surgeon if you notice redness, swelling, increased pain, pus, or a foul smell at the incision site.

## 2025-07-28 NOTE — OP NOTE
Date of Procedure:  07/28/2025    Procedure: Procedure(s) (LRB):  APPENDECTOMY-LAPAROSCOPIC (N/A)    Surgeon(s) and Role:  * Ebenezer Walker MD - Primary    Assisting Surgeon: Stephanie Kelsey PGY2    Pre-Operative Diagnosis: Acute appendicitis without mention of peritonitis    Post-Operative Diagnosis: Acute appendicitis without mention of peritonitis    Anesthesia: General    Technical Procedures Used: lap    INDICATIONS: The patient is a 34 y.o. female  who presents to the emergency room with clinical and radiologic evidence of acute appendicitis. The patient was counseled on his options for treatment and desired to proceed with surgical intervention. The risks of the procedure were described to the patient including bleeding, infection, pain, scarring, wound complications, injury to local structures, and potential need for further surgery. The patient demonstrated understanding of these risks and a consent   form was obtained.    PROCEDURE: The patient was identified in the Preoperative Unit and taken back   to the Operating Room and laid supine on the operating room table. IV   antibiotics were administered prior to the induction of general anesthesia.   General anesthesia was induced without complication. The patient was then   prepped and draped in standard sterile fashion manner. A timeout procedure was   performed in accordance of hospital protocol.     A 5 mm incision was made in the   supraumbilical location using a #15 blade scalpel. The 5mm optical trocar was then inserted under direct vision. CO2 insufflation was initiated. A camera was inserted and the abdomen  was inspected. There was evidence of acute inflammation in the right lower quadrant.   There did not appear to be any other abnormalities within the abdomen.     Additional 12 mm cannula then placed in the left lower quadrant of the abdomen and a 5mm cannula was placed in the suprapubic region under direct vision.The patient was placed in  Trendelenburg and left lateral decubitus position. The small intestines were retracted in the cephalad and left lateral direction away from the pelvis and right lower quadrant. The patient was found to have an enlarged and inflamed appendix. There was no evidence of perforation.  A blue load endo-SUPA was fired across the appendiceal base. A ligasure was then used to fire across the mesoappendix. There was no evidence of bleeding, leakage, or complication after division of the appendix. Irrigation was also performed and this fluid was evacuated from the abdomen. The appendix was placed into an endocatch bag and removed through the left low quadrant port site.   The LLQ port site was closed using vicryl sutures in a figure eight fashion at the level of the fascia. The trocar site skin wounds were closed using monocryl.    Instrument, sponge, and needle counts were correct at the conclusion of the case.       Complications: None; patient tolerated the procedure well.    Estimated Blood Loss (EBL): 10mL    Drains: none    Implants: none    Specimens: appendix    Condition: stable    Disposition: PACU - hemodynamically stable.    Attestation: I was present and scrubbed for the entire procedure.

## 2025-07-28 NOTE — H&P
[facility]  General Surgery  History & Physical    Patient Name: Estelle Mayer  MRN: [id number]  Admission Date: 2025  Attending Physician: Ebenezer Walker MD   Primary Care Provider: Violeta Gallardo MD    Patient information was obtained from patient, past medical records, and ER records.     Subjective:     Chief Complaint:   Chief Complaint   Patient presents with    Abdominal Pain     Pt reports abdominal pain for 3 days. Reports her stomach feels like it is on fire. Reports Nausea, but unable to vomit. Last BM this morning and normal. Denies trauma. Reports was told she had gallstones a year ago, and was supposed to have cholecystectomy, but did not have procedure.         History of Present Illness: 34-year-old female who is otherwise healthy no major medical problems except for cholelithiasis presented to the emergency after about 24 hours of periumbilical abdominal pain that is radiating to the right lower quadrant associated with nausea and vomiting CT scan with evidence of acute appendicitis for which General surgery was consulted.    On my evaluation, patient is hemodynamically stable afebrile states that her pain started early this afternoon that originally was.  Umbilical but now is worse in the right lower quadrant.  CT scan with distend appendix up to 1.4 cm and appendicolith at the base of the appendix.    Only surgical history is  section  No blood thinners    Current Facility-Administered Medications on File Prior to Encounter   Medication    [COMPLETED] HYDROcodone-acetaminophen 5-325 mg per tablet 1 tablet    [COMPLETED] ketorolac injection 15 mg    [DISCONTINUED] ondansetron injection 4 mg     Current Outpatient Medications on File Prior to Encounter   Medication Sig    diclofenac (VOLTAREN) 75 MG EC tablet Take 1 tablet (75 mg total) by mouth 2 (two) times daily.    dicyclomine (BENTYL) 20 mg tablet Take 1 tablet (20 mg total) by mouth 3 (three) times daily.     DOCOSAHEXANOIC ACID (PRENATAL DHA ORAL) Take by mouth.    fluticasone propionate (FLONASE) 50 mcg/actuation nasal spray 1 spray (50 mcg total) by Each Nostril route 2 (two) times daily as needed for Rhinitis.    HYDROcodone-acetaminophen (NORCO) 5-325 mg per tablet Take 1 tablet by mouth every 6 (six) hours as needed for Pain.    ondansetron (ZOFRAN-ODT) 4 MG TbDL Take 1 tablet (4 mg total) by mouth every 8 (eight) hours as needed.    oxybutynin (DITROPAN-XL) 5 MG TR24 Take 1 tablet (5 mg total) by mouth once daily.    pantoprazole (PROTONIX) 20 MG tablet Take 1 tablet (20 mg total) by mouth once daily.    tamsulosin (FLOMAX) 0.4 mg Cap Take 1 capsule (0.4 mg total) by mouth once daily. for 14 days    [DISCONTINUED] dicyclomine (BENTYL) 20 mg tablet Take 1 tablet (20 mg total) by mouth every 6 (six) hours.    [DISCONTINUED] dicyclomine (BENTYL) 20 mg tablet Take 1 tablet (20 mg total) by mouth 3 (three) times daily.    [DISCONTINUED] dicyclomine (BENTYL) 20 mg tablet Take 1 tablet (20 mg total) by mouth 3 (three) times daily.    [DISCONTINUED] ibuprofen (ADVIL,MOTRIN) 800 MG tablet Take 1 tablet (800 mg total) by mouth every 6 (six) hours as needed for Pain.    [DISCONTINUED] metroNIDAZOLE (FLAGYL) 500 MG tablet metronidazole 500 mg tablet   Take 1 tablet twice a day by oral route for 7 days.    [DISCONTINUED] nitrofurantoin, macrocrystal-monohydrate, (MACROBID) 100 MG capsule nitrofurantoin monohydrate/macrocrystals 100 mg capsule   Take 1 capsule every 12 hours by oral route for 5 days.    [DISCONTINUED] ondansetron (ZOFRAN-ODT) 4 MG TbDL Take 1 tablet (4 mg total) by mouth every 8 (eight) hours as needed.    [DISCONTINUED] ondansetron (ZOFRAN-ODT) 4 MG TbDL Take 1 tablet (4 mg total) by mouth every 8 (eight) hours as needed (nausea).    [DISCONTINUED] ondansetron (ZOFRAN-ODT) 4 MG TbDL Take 1 tablet (4 mg total) by mouth every 8 (eight) hours as needed.    [DISCONTINUED] pantoprazole (PROTONIX) 20 MG tablet Take  1 tablet (20 mg total) by mouth once daily.       Review of patient's allergies indicates:  No Known Allergies    No past medical history on file.  Past Surgical History:   Procedure Laterality Date    ABDOMINAL SURGERY Bilateral 2016    tubal ligation     SECTION, LOW TRANSVERSE      failed induction for oligo    EYE SURGERY      on right eye stye     Family History    None       Tobacco Use    Smoking status: Never    Smokeless tobacco: Never   Substance and Sexual Activity    Alcohol use: Yes     Comment: occasionally for social events    Drug use: No    Sexual activity: Yes     Partners: Male     Review of Systems   All other systems reviewed and are negative.    Objective:     Vital Signs (Most Recent):  Temp: 99.8 °F (37.7 °C) (25)  Pulse: 83 (25)  Resp: 16 (25)  BP: 117/68 (25)  SpO2: 100 % (25) Vital Signs (24h Range):  Temp:  [97.9 °F (36.6 °C)-99.8 °F (37.7 °C)] 99.8 °F (37.7 °C)  Pulse:  [77-95] 83  Resp:  [14-18] 16  SpO2:  [100 %] 100 %  BP: (109-119)/(68-78) 117/68     Weight: 59 kg (130 lb 1.1 oz)  Body mass index is 19.78 kg/m².     Physical Exam  Constitutional:       Appearance: Normal appearance.   HENT:      Head: Normocephalic and atraumatic.   Cardiovascular:      Rate and Rhythm: Normal rate.   Pulmonary:      Effort: Pulmonary effort is normal.   Abdominal:      General: Abdomen is flat.      Tenderness: There is abdominal tenderness in the right lower quadrant. Positive signs include McBurney's sign.   Skin:     General: Skin is warm and dry.   Neurological:      General: No focal deficit present.      Mental Status: She is alert.   Psychiatric:         Mood and Affect: Mood normal.            I have reviewed all pertinent lab results within the past 24 hours.  CBC:   Recent Labs   Lab 254   WBC 10.48   RBC 4.60   HGB 9.0*   HCT 31.8*      MCV 69*   MCH 19.6*   MCHC 28.3*     CMP:   Recent Labs   Lab  07/27/25  2114   GLU 95   CALCIUM 9.6   ALBUMIN 4.0   PROT 7.3      K 3.9   CO2 25      BUN 7   CREATININE 0.7   ALKPHOS 57   ALT 10   AST 18   BILITOT 1.3*       Significant Diagnostics:  I have reviewed all pertinent imaging results/findings within the past 24 hours.    Assessment/Plan:     * Acute appendicitis with localized peritonitis  34-year-old female with past medical history presenting with 24 hours of right lower quadrant pain CT scan with evidence of acute uncomplicated appendicitis.    - admit to General surgery  - we will plan for laparoscopic appendectomy 7/28/25  - consented  - NPO, mIVF  - zosyn  - pain control and antiemetics        VTE Risk Mitigation (From admission, onward)           Ordered     Place sequential compression device  Until discontinued         07/27/25 1716                    Stephanie Vargas MD  General Surgery  [facility] Dept

## 2025-07-28 NOTE — ED PROVIDER NOTES
Encounter Date: 2025       History     Chief Complaint   Patient presents with    Abdominal Pain     Pt reports abdominal pain for 3 days. Reports her stomach feels like it is on fire. Reports Nausea, but unable to vomit. Last BM this morning and normal. Denies trauma. Reports was told she had gallstones a year ago, and was supposed to have cholecystectomy, but did not have procedure.      HPI  34-year-old female, no pertinent medical history other than gallstones, presents brought in by self through triage for abdominal pain.  Pain that is started in the epigastrium earlier this morning, and has been seen at outside hospital and was discharged after symptoms improved and labs, patient states that she had had similar episodes in the past that were attributed to symptomatic cholelithiasis, that she was supposed to have a cholecystectomy but was unable to follow up for it.  States that since this morning she has had progressively worsening abdominal pain that has since generalized of the entire abdomen, now worse in the lower abdomen than upper.  She denies any other systemic or infectious symptoms or other acute complaints.  Review of patient's allergies indicates:  No Known Allergies  History reviewed. No pertinent past medical history.  Past Surgical History:   Procedure Laterality Date    ABDOMINAL SURGERY Bilateral 2016    tubal ligation     SECTION, LOW TRANSVERSE      failed induction for oligo    EYE SURGERY      on right eye stye     No family history on file.  Social History[1]  Medical surgical family social history reviewed  Review of Systems  Complete review of systems was conducted and was negative except as per HPI and as marked  Physical Exam     Initial Vitals   BP Pulse Resp Temp SpO2   25 2042 25 2042 25 20425 2043 07/27/25 2042   119/78 95 16 99.8 °F (37.7 °C) 100 %      MAP       --                Physical Exam  Physical:  General: Awake, alert, no acute  distress but in apparent discomfort  Head: Normocephalic, atraumatic  Neck: Trachea midline, full range of motion, no meningismus  ENT: Atraumatic, Airway Patent  Cardio: Regular Rate, Regular Rhythm, skin perfusion normal  Chest: Atraumatic, No respiratory distress no use of accessory muscles to breath, normal rate  Abdomen is soft with voluntary guarding, tenderness in the lower abdomen  Upper Ext: Atraumatic, Inspection normal, no swelling  Lower Ext: Atraumatic, Inspection normal, no swelling  Neuro: No gross cranial nerve abnormality, no lateralization, no gross sensory or motor deficits  ED Course   Procedures  Labs Reviewed   COMPREHENSIVE METABOLIC PANEL - Abnormal       Result Value    Sodium 139      Potassium 3.9      Chloride 106      CO2 25      Glucose 95      BUN 7      Creatinine 0.7      Calcium 9.6      Protein Total 7.3      Albumin 4.0      Bilirubin Total 1.3 (*)     ALP 57      AST 18      ALT 10      Anion Gap 8      eGFR >60     CBC WITH DIFFERENTIAL - Abnormal    WBC 10.48      RBC 4.60      HGB 9.0 (*)     HCT 31.8 (*)     MCV 69 (*)     MCH 19.6 (*)     MCHC 28.3 (*)     RDW 17.5 (*)     Platelet Count 383      MPV 9.6      Nucleated RBC 0      Neut % 77.8 (*)     Lymph % 14.8 (*)     Mono % 6.3      Eos % 0.5      Basophil % 0.3      Imm Grans % 0.3      Neut # 8.16 (*)     Lymph # 1.55      Mono # 0.66      Eos # 0.05      Baso # 0.03      Imm Grans # 0.03     LIPASE - Normal    Lipase Level 14     URINALYSIS, REFLEX TO URINE CULTURE - Normal    Color, UA Yellow      Appearance, UA Clear      pH, UA 7.0      Spec Grav UA 1.015      Protein, UA Negative      Glucose, UA Negative      Ketones, UA Negative      Bilirubin, UA Negative      Blood, UA Negative      Nitrites, UA Negative      Urobilinogen, UA Negative      Leukocyte Esterase, UA Negative     C-REACTIVE PROTEIN - Normal    CRP 6.2     LACTIC ACID, PLASMA - Normal    Lactic Acid Level 1.9      Narrative:     Falsely low lactic  acid results can be found in samples containing >=13.0 mg/dL total bilirubin and/or >=3.5 mg/dL direct bilirubin.    PREGNANCY TEST, URINE RAPID - Normal    hCG Qualitative, Urine Negative     APTT - Normal    PTT 25.9     PROTIME-INR - Normal    PT 11.5      INR 1.0     CULTURE, BLOOD   CULTURE, BLOOD   CBC W/ AUTO DIFFERENTIAL    Narrative:     The following orders were created for panel order CBC auto differential.  Procedure                               Abnormality         Status                     ---------                               -----------         ------                     CBC with Differential[[id number]]       Abnormal            Final result                 Please view results for these tests on the individual orders.   GREY TOP URINE HOLD   EXTRA TUBES    Narrative:     The following orders were created for panel order EXTRA TUBES.  Procedure                               Abnormality         Status                     ---------                               -----------         ------                     Lavender Top Hold[[id number]]                                                          Gold Top Hold[[id number]]                                   In process                   Please view results for these tests on the individual orders.   LAVENDER TOP HOLD   GOLD TOP HOLD   CBC W/ AUTO DIFFERENTIAL    Narrative:     The following orders were created for panel order CBC Auto Differential.  Procedure                               Abnormality         Status                     ---------                               -----------         ------                     CBC with Differential[[id number]]                                                        Please view results for these tests on the individual orders.   COMPREHENSIVE METABOLIC PANEL   MAGNESIUM   PHOSPHORUS   CBC WITH DIFFERENTIAL   TYPE & SCREEN    Specimen Outdate 07/30/2025 23:59      Group & Rh O POS      Indirect Luis Daniel NEG             Imaging Results               CT Abdomen Pelvis With IV Contrast NO Oral Contrast (Final result)  Result time 07/27/25 22:19:01      Final result by Luis Enrique Eckert DO (07/27/25 22:19:01)                   Impression:      Acute, uncomplicated appendicitis.  No abscess.    This report was flagged in Epic as abnormal.      Electronically signed by: Luis Enrique Eckert  Date:    07/27/2025  Time:    22:19               Narrative:    EXAMINATION:  CT ABDOMEN PELVIS WITH IV CONTRAST    CLINICAL HISTORY:  Abdominal abscess/infection suspected;    TECHNIQUE:  Axial CT images with sagittal and coronal reformats were obtained of the abdomen and pelvis from the hemidiaphragms through the symphysis pubis after the administration of 75mL Omnipaque 350.    COMPARISON:  CT abdomen and pelvis from 11/09/2024.    FINDINGS:  Lung Bases: Clear.    Heart: Heart size is normal.  No pericardial effusion.    Liver: The liver is normal in size and demonstrates homogeneous enhancement without focal lesion.  The portal vasculature is patent.    Biliary tract: No intrahepatic or extrahepatic biliary ductal dilatation.    Gallbladder: No radiodense gallstone. No wall thickening or pericholecystic fluid.    Pancreas: Normal. No pancreatic ductal dilatation.    Spleen: Normal size without focal lesion.    Adrenals: Unremarkable.    Kidneys and urinary collecting systems: There is a simple cyst in the right kidney midpole.  No hydronephrosis or urolithiasis.    Lymph nodes: None enlarged.    Stomach and bowel: The stomach is normal.  Loops of small and large bowel are normal in caliber without evidence for inflammation or obstruction.  The appendix is distended, measuring up to 1.4 cm maximal diameter, and demonstrates wall thickening and adjacent periappendiceal fat stranding.  There is an appendicolith in the region of the appendiceal base.  Findings are compatible with acute appendicitis.  There is no abscess.    Peritoneum and mesentery:  No ascites or free intraperitoneal air.  No abdominal fluid collection.    Vasculature: No aneurysm or significant atherosclerosis.    Urinary bladder: No wall thickening.    Reproductive organs: The uterus and adnexae are unremarkable.  There is trace physiologic free fluid in the pelvis.    Body wall: No abnormality.    Musculoskeletal: No aggressive osseous lesion.                                       X-Ray Chest AP Portable (Final result)  Result time 07/27/25 22:10:29      Final result by Luis Enrique Eckert DO (07/27/25 22:10:29)                   Impression:      No acute abnormality.      Electronically signed by: Luis Enrique Eckert  Date:    07/27/2025  Time:    22:10               Narrative:    EXAMINATION:  XR CHEST AP PORTABLE    CLINICAL HISTORY:  Sepsis;    TECHNIQUE:  Single frontal view of the chest was performed.    COMPARISON:  06/05/2014.    FINDINGS:  The lungs are well expanded and clear. No focal opacities are seen. The pleural spaces are clear. The cardiac silhouette is unremarkable. The visualized osseous structures are unremarkable.                                       Medications   ondansetron injection 4 mg (4 mg Intravenous Given 7/28/25 0012)   piperacillin-tazobactam (ZOSYN) 4.5 g in D5W 100 mL IVPB (MB+) (has no administration in time range)   oxyCODONE immediate release tablet 5 mg (has no administration in time range)   HYDROmorphone injection 0.5 mg (0.5 mg Intravenous Given 7/28/25 0012)   lactated ringers infusion ( Intravenous New Bag 7/27/25 2316)   acetaminophen tablet 1,000 mg (has no administration in time range)   HYDROmorphone injection 1 mg (1 mg Intravenous Given 7/27/25 2129)   ondansetron injection 8 mg (8 mg Intravenous Given 7/27/25 2128)   sodium chloride 0.9% bolus 1,770 mL 1,770 mL (0 mLs Intravenous Stopped 7/27/25 2215)   piperacillin-tazobactam (ZOSYN) 4.5 g in D5W 100 mL IVPB (MB+) (0 g Intravenous Stopped 7/27/25 2208)   iohexoL (OMNIPAQUE 350) injection 75 mL (75  mLs Intravenous Given 7/27/25 9820)     Medical Decision Making  Amount and/or Complexity of Data Reviewed  Labs: ordered.  Radiology: ordered.    Risk  Prescription drug management.                34-year-old female presented for progressively worsening diffuse abdominal pain.  She is hemodynamically stable but in ashlie in obvious discomfort.  Will evaluate for acute pathology requiring intervention with appropriate studies, treat symptomatically, and reassess.  Differential diagnosis includes a broad range of intra-abdominal processes including but not limited to cholecystitis, cholangitis/choledocholithiasis, appendicitis, ovarian torsion, etcetera.  Bundle therapy was initiated with antibiotics appropriate for intra-abdominal process, given elevated temperature of 99.8°, borderline elevated heart rate.    Studies reviewed, CT is consistent with uncomplicated acute appendicitis.    Did not ultimately meet criteria for sepsis despite presence of SB I patient and family counseled on need for admission and agrees.    Preoperative labs ordered.  Patient made NPO.    Could meet sepsis criteria due to infectious source, white count of 14, heart rate between 90 and 100.  Does not meet criteria for severe sepsis or septic shock.    All resulted studies reviewed and treatments given. Patient to be admitted for further evaluation and treatment. All elements of care discussed with the admitting team who will continue care and accepts admission. As courtesy, means to expedite timely care, and as is typical for transition from the emergency department to the hospital at this institution as of this date, bridge orders were placed pre the request of the admitting team. The admitting team is aware of pending studies which they will be following up, and medications ordered were ordered with their agreement and at their request. These do not represent complete admission orders, which will be placed by the admitting team in a  timely manner.        Sepsis reassessment, I certify that a sepsis reassessment was performed within the 1st 6 hours present in the ED, including recheck of heart rate, rechecked a blood pressure, capillary refill reassessment, all of which are either the same or improved on reassessment.           Clinical Impression:  Final diagnoses:  [Z13.6] Screening for cardiovascular condition  [K35.80] Acute appendicitis (Primary)  [R10.9] Abdominal pain, unspecified abdominal location          ED Disposition Condition    Observation                     Jose Borrero MD  07/28/25 0137         [1]   Social History  Tobacco Use    Smoking status: Never    Smokeless tobacco: Never   Substance Use Topics    Alcohol use: Yes     Comment: occasionally for social events    Drug use: No        Jose Borrero MD  07/28/25 0141

## 2025-07-28 NOTE — HPI
34-year-old female who is otherwise healthy no major medical problems except for cholelithiasis presented to the emergency after about 24 hours of periumbilical abdominal pain that is radiating to the right lower quadrant associated with nausea and vomiting CT scan with evidence of acute appendicitis for which General surgery was consulted.    On my evaluation, patient is hemodynamically stable afebrile states that her pain started early this afternoon that originally was.  Umbilical but now is worse in the right lower quadrant.  CT scan with distend appendix up to 1.4 cm and appendicolith at the base of the appendix.    Only surgical history is  section  No blood thinners

## 2025-07-28 NOTE — ANESTHESIA PREPROCEDURE EVALUATION
[facility]  Anesthesia Pre-Operative Evaluation         Patient Name/: Estelle Mayer, 1990  MRN: [id number]    SUBJECTIVE:     Pre-operative evaluation for Procedure(s) (LRB):  APPENDECTOMY, LAPAROSCOPIC (N/A)     2025    Estelle Mayer is a 34 y.o. female w/ a significant PMHx of cholelithiasis. She presented to [facility] ED 25 with nausea and abdominal pain. CT scan with distended appendix up to 1.4 cm appendicolith at the base of the appendix. General surgery consulted and diagnosed with acute appendicitis with localized peritonitis.     Patient now presents for the above procedure(s).    Patient denies any other known cardiopulmonary disease.     Functional status: >4 mets    NPO status: Appropriate    Previous Anesthetics: None documented for review    Previous Airway: None documented for review      ________________________________________  Echo:  No results found for this or any previous visit.    ________________________________________        Problem List[1]    Review of patient's allergies indicates:  No Known Allergies    Current Inpatient Medications:    acetaminophen  1,000 mg Oral Q8H    piperacillin-tazobactam (Zosyn) IV (PEDS and ADULTS) (extended infusion is not appropriate)  4.5 g Intravenous Q8H       Medications Ordered Prior to Encounter[2]    Past Surgical History:   Procedure Laterality Date    ABDOMINAL SURGERY Bilateral 2016    tubal ligation     SECTION, LOW TRANSVERSE      failed induction for oligo    EYE SURGERY      on right eye stye       Social History:  Tobacco Use: Low Risk  (2025)    Patient History     Smoking Tobacco Use: Never     Smokeless Tobacco Use: Never     Passive Exposure: Not on file       Alcohol Use: Not on file       OBJECTIVE:     Vital Signs Range:  BMI Readings from Last 1 Encounters:   25 19.78 kg/m²       Temp:  [36.8 °C (98.3 °F)-37.7 °C (99.8 °F)]   Pulse:  [73-95]   Resp:  [16-20]   BP: ()/(55-78)    SpO2:  [99 %-100 %]        Significant Labs:        Component Value Date/Time    WBC 15.99 (H) 07/28/2025 0436    HGB 8.5 (L) 07/28/2025 0436    HGB 9.0 (L) 12/17/2024 2133    HCT 30.6 (L) 07/28/2025 0436    HCT 31.4 (L) 12/17/2024 2133     07/28/2025 0436     12/17/2024 2133     07/28/2025 0436     12/17/2024 2133    K 3.7 07/28/2025 0436    K 3.7 12/17/2024 2133     07/28/2025 0436     12/17/2024 2133    CO2 24 07/28/2025 0436    CO2 26 12/17/2024 2133     07/28/2025 0436    GLU 82 12/17/2024 2133    BUN 5 (L) 07/28/2025 0436    CREATININE 0.6 07/28/2025 0436    MG 1.7 07/28/2025 0436    PHOS 2.9 07/28/2025 0436    CALCIUM 9.0 07/28/2025 0436    CALCIUM 9.7 12/17/2024 2133    ALBUMIN 3.7 07/28/2025 0436    ALBUMIN 3.8 12/17/2024 2133    PROT 7.0 07/28/2025 0436    PROT 7.2 12/17/2024 2133    ALKPHOS 50 07/28/2025 0436    ALKPHOS 54 12/17/2024 2133    BILITOT 1.5 (H) 07/28/2025 0436    BILITOT 0.5 12/17/2024 2133    AST 21 07/28/2025 0436    AST 14 12/17/2024 2133    ALT 9 (L) 07/28/2025 0436    ALT 10 12/17/2024 2133    INR 1.0 07/27/2025 2251        Please see Results Review for additional labs.     Diagnostic Studies: No relevant studies.    EKG:   No results found for this or any previous visit.    ECHO:  See subjective, if available.      ASSESSMENT/PLAN:           Pre-op Assessment    I have reviewed the Patient Summary Reports.     I have reviewed the Nursing Notes. I have reviewed the NPO Status.   I have reviewed the Medications.     Review of Systems  Social:  Alcohol Use, Non-Smoker       Cardiovascular:      Denies Hypertension.        Denies CHF.         Patient not on beta blockers                          Pulmonary:    Denies COPD.  Denies Asthma.                    Hepatic/GI:      Denies GERD.       Hepatic/GI Symptoms: nausea, vomiting, abdominal pain.          Neurological:    Denies CVA.    Denies Seizures.                                 Endocrine:  Denies Diabetes.         Denies Obesity / BMI > 30      Physical Exam  General: Well nourished, Cooperative, Alert and Oriented    Airway:  Mouth Opening: Normal  TM Distance: Normal  Tongue: Normal  Neck ROM: Normal ROM        Anesthesia Plan  Type of Anesthesia, risks & benefits discussed:    Anesthesia Type: Gen ETT  Intra-op Monitoring Plan: Standard ASA Monitors  Post Op Pain Control Plan: multimodal analgesia and IV/PO Opioids PRN  Induction:  IV  Airway Plan: Direct and Video, Post-Induction  ASA Score: 2  Day of Surgery Review of History & Physical: H&P Update referred to the surgeon/provider.    Ready For Surgery From Anesthesia Perspective.     .           [1]   Patient Active Problem List  Diagnosis    History of  delivery, currently pregnant    Vomiting and diarrhea    Feeling of incomplete bladder emptying    Urinary frequency    Acute appendicitis with localized peritonitis   [2]   No current facility-administered medications on file prior to encounter.     Current Outpatient Medications on File Prior to Encounter   Medication Sig Dispense Refill    diclofenac (VOLTAREN) 75 MG EC tablet Take 1 tablet (75 mg total) by mouth 2 (two) times daily. 20 tablet 0    dicyclomine (BENTYL) 20 mg tablet Take 1 tablet (20 mg total) by mouth 3 (three) times daily. 30 tablet 0    DOCOSAHEXANOIC ACID (PRENATAL DHA ORAL) Take by mouth.      fluticasone propionate (FLONASE) 50 mcg/actuation nasal spray 1 spray (50 mcg total) by Each Nostril route 2 (two) times daily as needed for Rhinitis. 15 g 0    HYDROcodone-acetaminophen (NORCO) 5-325 mg per tablet Take 1 tablet by mouth every 6 (six) hours as needed for Pain. 12 tablet 0    ondansetron (ZOFRAN-ODT) 4 MG TbDL Take 1 tablet (4 mg total) by mouth every 8 (eight) hours as needed. 14 tablet 0    oxybutynin (DITROPAN-XL) 5 MG TR24 Take 1 tablet (5 mg total) by mouth once daily. 30 tablet 0    pantoprazole (PROTONIX) 20 MG tablet Take 1 tablet (20 mg  total) by mouth once daily. 30 tablet 0    tamsulosin (FLOMAX) 0.4 mg Cap Take 1 capsule (0.4 mg total) by mouth once daily. for 14 days 14 capsule 0

## 2025-07-28 NOTE — ED NOTES
Surgery nurse called for report on this patient.  She was informed that she had tylenol per order at 0618.

## 2025-07-28 NOTE — ANESTHESIA PROCEDURE NOTES
Intubation    Date/Time: 7/28/2025 7:46 AM    Performed by: David Kimble MD  Authorized by: Shelli Maldonado MD    Intubation:     Induction:  Rapid sequence induction    Intubated:  Postinduction    Mask Ventilation:  Not attempted    Attempts:  1    Attempted By:  Resident anesthesiologist    Method of Intubation:  Direct    Blade:  Chelsea 3    Laryngeal View Grade: Grade I - full view of cords      Difficult Airway Encountered?: No      Complications:  None    Airway Device:  Oral endotracheal tube    Airway Device Size:  7.0    Style/Cuff Inflation:  Cuffed (inflated to minimal occlusive pressure)    Tube secured:  23    Secured at:  The lips    Placement Verified By:  Capnometry    Complicating Factors:  None    Findings Post-Intubation:  BS equal bilateral and atraumatic/condition of teeth unchanged

## 2025-07-28 NOTE — ASSESSMENT & PLAN NOTE
34-year-old female with past medical history presenting with 24 hours of right lower quadrant pain CT scan with evidence of acute appendicitis.    - admit to General surgery  - we will plan for laparoscopic appendectomy 7/28/25  - consented  - NPO, Beatrice  - zosyn  - pain control and antiemetics

## 2025-07-28 NOTE — SUBJECTIVE & OBJECTIVE
Current Facility-Administered Medications on File Prior to Encounter   Medication    [COMPLETED] HYDROcodone-acetaminophen 5-325 mg per tablet 1 tablet    [COMPLETED] ketorolac injection 15 mg    [DISCONTINUED] ondansetron injection 4 mg     Current Outpatient Medications on File Prior to Encounter   Medication Sig    diclofenac (VOLTAREN) 75 MG EC tablet Take 1 tablet (75 mg total) by mouth 2 (two) times daily.    dicyclomine (BENTYL) 20 mg tablet Take 1 tablet (20 mg total) by mouth 3 (three) times daily.    DOCOSAHEXANOIC ACID (PRENATAL DHA ORAL) Take by mouth.    fluticasone propionate (FLONASE) 50 mcg/actuation nasal spray 1 spray (50 mcg total) by Each Nostril route 2 (two) times daily as needed for Rhinitis.    HYDROcodone-acetaminophen (NORCO) 5-325 mg per tablet Take 1 tablet by mouth every 6 (six) hours as needed for Pain.    ondansetron (ZOFRAN-ODT) 4 MG TbDL Take 1 tablet (4 mg total) by mouth every 8 (eight) hours as needed.    oxybutynin (DITROPAN-XL) 5 MG TR24 Take 1 tablet (5 mg total) by mouth once daily.    pantoprazole (PROTONIX) 20 MG tablet Take 1 tablet (20 mg total) by mouth once daily.    tamsulosin (FLOMAX) 0.4 mg Cap Take 1 capsule (0.4 mg total) by mouth once daily. for 14 days    [DISCONTINUED] dicyclomine (BENTYL) 20 mg tablet Take 1 tablet (20 mg total) by mouth every 6 (six) hours.    [DISCONTINUED] dicyclomine (BENTYL) 20 mg tablet Take 1 tablet (20 mg total) by mouth 3 (three) times daily.    [DISCONTINUED] dicyclomine (BENTYL) 20 mg tablet Take 1 tablet (20 mg total) by mouth 3 (three) times daily.    [DISCONTINUED] ibuprofen (ADVIL,MOTRIN) 800 MG tablet Take 1 tablet (800 mg total) by mouth every 6 (six) hours as needed for Pain.    [DISCONTINUED] metroNIDAZOLE (FLAGYL) 500 MG tablet metronidazole 500 mg tablet   Take 1 tablet twice a day by oral route for 7 days.    [DISCONTINUED] nitrofurantoin, macrocrystal-monohydrate, (MACROBID) 100 MG capsule nitrofurantoin  monohydrate/macrocrystals 100 mg capsule   Take 1 capsule every 12 hours by oral route for 5 days.    [DISCONTINUED] ondansetron (ZOFRAN-ODT) 4 MG TbDL Take 1 tablet (4 mg total) by mouth every 8 (eight) hours as needed.    [DISCONTINUED] ondansetron (ZOFRAN-ODT) 4 MG TbDL Take 1 tablet (4 mg total) by mouth every 8 (eight) hours as needed (nausea).    [DISCONTINUED] ondansetron (ZOFRAN-ODT) 4 MG TbDL Take 1 tablet (4 mg total) by mouth every 8 (eight) hours as needed.    [DISCONTINUED] pantoprazole (PROTONIX) 20 MG tablet Take 1 tablet (20 mg total) by mouth once daily.       Review of patient's allergies indicates:  No Known Allergies    No past medical history on file.  Past Surgical History:   Procedure Laterality Date    ABDOMINAL SURGERY Bilateral 2016    tubal ligation     SECTION, LOW TRANSVERSE      failed induction for oligo    EYE SURGERY      on right eye stye     Family History    None       Tobacco Use    Smoking status: Never    Smokeless tobacco: Never   Substance and Sexual Activity    Alcohol use: Yes     Comment: occasionally for social events    Drug use: No    Sexual activity: Yes     Partners: Male     Review of Systems   All other systems reviewed and are negative.    Objective:     Vital Signs (Most Recent):  Temp: 99.8 °F (37.7 °C) (25)  Pulse: 83 (25)  Resp: 16 (25)  BP: 117/68 (25)  SpO2: 100 % (25) Vital Signs (24h Range):  Temp:  [97.9 °F (36.6 °C)-99.8 °F (37.7 °C)] 99.8 °F (37.7 °C)  Pulse:  [77-95] 83  Resp:  [14-18] 16  SpO2:  [100 %] 100 %  BP: (109-119)/(68-78) 117/68     Weight: 59 kg (130 lb 1.1 oz)  Body mass index is 19.78 kg/m².     Physical Exam  Constitutional:       Appearance: Normal appearance.   HENT:      Head: Normocephalic and atraumatic.   Cardiovascular:      Rate and Rhythm: Normal rate.   Pulmonary:      Effort: Pulmonary effort is normal.   Abdominal:      General: Abdomen is flat.      Tenderness:  There is abdominal tenderness in the right lower quadrant. Positive signs include McBurney's sign.   Skin:     General: Skin is warm and dry.   Neurological:      General: No focal deficit present.      Mental Status: She is alert.   Psychiatric:         Mood and Affect: Mood normal.            I have reviewed all pertinent lab results within the past 24 hours.  CBC:   Recent Labs   Lab 07/27/25 2114   WBC 10.48   RBC 4.60   HGB 9.0*   HCT 31.8*      MCV 69*   MCH 19.6*   MCHC 28.3*     CMP:   Recent Labs   Lab 07/27/25 2114   GLU 95   CALCIUM 9.6   ALBUMIN 4.0   PROT 7.3      K 3.9   CO2 25      BUN 7   CREATININE 0.7   ALKPHOS 57   ALT 10   AST 18   BILITOT 1.3*       Significant Diagnostics:  I have reviewed all pertinent imaging results/findings within the past 24 hours.

## 2025-07-29 LAB
OHS QRS DURATION: 82 MS
OHS QTC CALCULATION: 392 MS

## 2025-07-29 NOTE — DISCHARGE SUMMARY
[place] - [facility] ([facility])  General Surgery  Discharge Summary      Patient Name: Estelle Mayer  MRN: [id number]  Admission Date: 2025  Hospital Length of Stay: 0 days  Discharge Date and Time: 2025  1:37 PM  Attending Physician: No att. providers found   Discharging Provider: Stephanie Vargas MD  Primary Care Provider: Violeta Gallardo MD     HPI: 34-year-old female who is otherwise healthy no major medical problems except for cholelithiasis presented to the emergency after about 24 hours of periumbilical abdominal pain that is radiating to the right lower quadrant associated with nausea and vomiting CT scan with evidence of acute appendicitis for which General surgery was consulted.     On my evaluation, patient is hemodynamically stable afebrile states that her pain started early this afternoon that originally was.  Umbilical but now is worse in the right lower quadrant.  CT scan with distend appendix up to 1.4 cm and appendicolith at the base of the appendix.     Only surgical history is  section  No blood thinners    Procedure(s) (LRB):  APPENDECTOMY, LAPAROSCOPIC (N/A)     Hospital Course: Patient presented to the ED with acute appendicitis. Underwent lap appendectomy on 25 without complication. Patient recovered well without complication and recovered well in PACU and was stable for discharge home.     Consults:     Significant Diagnostic Studies: N/A    Pending Diagnostic Studies:       None          Final Active Diagnoses:    Diagnosis Date Noted POA    PRINCIPAL PROBLEM:  Acute appendicitis with localized peritonitis [K35.30] 2025 Yes      Problems Resolved During this Admission:      Discharged Condition: good    Disposition: Home or Self Care    Follow Up:   Follow-up Information       Ebenezer Walker MD Follow up in 2 week(s).    Specialties: Surgery, General Surgery  Contact information:  [address]  [address]  [place] [address]  [phone]                            Patient Instructions:      Diet Adult Regular     Lifting restrictions   Order Comments: No lifting greater than 10 pounds for 6 weeks from day of surgery.  No pushing/pulling such as vacuuming or raking.  No straining, avoid constipation and take stool softeners as described and laxatives as needed.  No driving while on narcotics and until you can react quickly without pain.     No dressing needed   Order Comments: WOUND CARE  You have skin glue over your incision(s)  This will slowly flake away in about 10 days  It is okay to shower starting the day after surgery  Can pat your incision dry  Please do not scrub hard over your incisions  Please do not pick the glue off or it will reopen the wound     Notify your health care provider if you experience any of the following:  temperature >100.4     Notify your health care provider if you experience any of the following:  severe uncontrolled pain     Notify your health care provider if you experience any of the following:  redness, tenderness, or signs of infection (pain, swelling, redness, odor or green/yellow discharge around incision site)     Medications:  Reconciled Home Medications:      Medication List        START taking these medications      acetaminophen 500 MG tablet  Commonly known as: TYLENOL  Take 2 tablets (1,000 mg total) by mouth every 8 (eight) hours.     oxyCODONE 5 MG immediate release tablet  Commonly known as: ROXICODONE  Take 1 tablet (5 mg total) by mouth every 4 (four) hours as needed.            CONTINUE taking these medications      diclofenac 75 MG EC tablet  Commonly known as: VOLTAREN  Take 1 tablet (75 mg total) by mouth 2 (two) times daily.     ondansetron 4 MG Tbdl  Commonly known as: ZOFRAN-ODT  Take 1 tablet (4 mg total) by mouth every 8 (eight) hours as needed.     pantoprazole 20 MG tablet  Commonly known as: PROTONIX  Take 1 tablet (20 mg total) by mouth once daily.            STOP taking these medications      dicyclomine  20 mg tablet  Commonly known as: BENTYL     fluticasone propionate 50 mcg/actuation nasal spray  Commonly known as: FLONASE     HYDROcodone-acetaminophen 5-325 mg per tablet  Commonly known as: NORCO     oxybutynin 5 MG Tr24  Commonly known as: DITROPAN-XL     PRENATAL DHA ORAL     tamsulosin 0.4 mg Cap  Commonly known as: FLOMAX              Stephanie Vargas MD  General Surgery  [facility])   severe

## 2025-07-31 LAB
ESTROGEN SERPL-MCNC: NORMAL PG/ML
INSULIN SERPL-ACNC: NORMAL U[IU]/ML
LAB AP CLINICAL INFORMATION: NORMAL
LAB AP GROSS DESCRIPTION: NORMAL
LAB AP PERFORMING LOCATION(S): NORMAL
LAB AP REPORT FOOTNOTES: NORMAL

## 2025-08-02 LAB
BACTERIA BLD CULT: NORMAL
BACTERIA BLD CULT: NORMAL

## 2025-08-15 ENCOUNTER — OFFICE VISIT (OUTPATIENT)
Dept: SURGERY | Facility: CLINIC | Age: 35
End: 2025-08-15
Payer: MEDICAID

## 2025-08-15 VITALS
HEIGHT: 68 IN | SYSTOLIC BLOOD PRESSURE: 109 MMHG | DIASTOLIC BLOOD PRESSURE: 71 MMHG | HEART RATE: 71 BPM | BODY MASS INDEX: 20.36 KG/M2 | TEMPERATURE: 98 F | WEIGHT: 134.38 LBS

## 2025-08-15 DIAGNOSIS — K80.20 SYMPTOMATIC CHOLELITHIASIS: ICD-10-CM

## 2025-08-15 PROCEDURE — 99213 OFFICE O/P EST LOW 20 MIN: CPT | Mod: PBBFAC,PN | Performed by: STUDENT IN AN ORGANIZED HEALTH CARE EDUCATION/TRAINING PROGRAM

## 2025-08-15 PROCEDURE — 99999 PR PBB SHADOW E&M-EST. PATIENT-LVL III: CPT | Mod: PBBFAC,,, | Performed by: STUDENT IN AN ORGANIZED HEALTH CARE EDUCATION/TRAINING PROGRAM

## (undated) DEVICE — TRAY CATH 1-LYR URIMTR 16FR

## (undated) DEVICE — ELECTRODE REM PLYHSV RETURN 9

## (undated) DEVICE — GLOVE SURGICAL LATEX SZ 7

## (undated) DEVICE — R SEALER LIGASURE LAP 37CM 5MM

## (undated) DEVICE — TROCAR ENDOPATH XCEL 5MM 7.5CM

## (undated) DEVICE — ADHESIVE DERMABOND ADVANCED

## (undated) DEVICE — NDL HYPO REG 25G X 1 1/2

## (undated) DEVICE — TROCAR ENDOPATH XCEL 5X75MM

## (undated) DEVICE — SYS CLSR WND ENDOSCP XL

## (undated) DEVICE — SUT MCRYL PLUS 4-0 PS2 27IN

## (undated) DEVICE — Device

## (undated) DEVICE — TROCAR ENDOPATH XCEL 12X100MM

## (undated) DEVICE — CART STAPLE FLEX ETX 3.5MM BLU

## (undated) DEVICE — SUT VICRYL+ 27 UR-6 VIOL

## (undated) DEVICE — STAPLER INT LINEAR ARTC 3.5-45

## (undated) DEVICE — BAG TISS RETRV MONARCH 10MM